# Patient Record
Sex: FEMALE | Race: BLACK OR AFRICAN AMERICAN | NOT HISPANIC OR LATINO | Employment: UNEMPLOYED | URBAN - METROPOLITAN AREA
[De-identification: names, ages, dates, MRNs, and addresses within clinical notes are randomized per-mention and may not be internally consistent; named-entity substitution may affect disease eponyms.]

---

## 2024-03-04 ENCOUNTER — HOSPITAL ENCOUNTER (EMERGENCY)
Facility: CLINIC | Age: 29
Discharge: HOME | End: 2024-03-04
Attending: EMERGENCY MEDICINE | Admitting: EMERGENCY MEDICINE
Payer: MEDICAID

## 2024-03-04 ENCOUNTER — APPOINTMENT (OUTPATIENT)
Dept: GENERAL RADIOLOGY | Facility: CLINIC | Age: 29
End: 2024-03-04
Attending: EMERGENCY MEDICINE
Payer: MEDICAID

## 2024-03-04 VITALS
BODY MASS INDEX: 37.56 KG/M2 | HEART RATE: 60 BPM | SYSTOLIC BLOOD PRESSURE: 128 MMHG | RESPIRATION RATE: 16 BRPM | TEMPERATURE: 97.8 F | OXYGEN SATURATION: 99 % | DIASTOLIC BLOOD PRESSURE: 88 MMHG | HEIGHT: 64 IN | WEIGHT: 220 LBS

## 2024-03-04 DIAGNOSIS — S82.831A CLOSED FRACTURE OF DISTAL END OF RIGHT FIBULA, UNSPECIFIED FRACTURE MORPHOLOGY, INITIAL ENCOUNTER: ICD-10-CM

## 2024-03-04 PROCEDURE — 99284 EMERGENCY DEPT VISIT MOD MDM: CPT | Mod: 25 | Performed by: EMERGENCY MEDICINE

## 2024-03-04 PROCEDURE — 99284 EMERGENCY DEPT VISIT MOD MDM: CPT | Performed by: EMERGENCY MEDICINE

## 2024-03-04 PROCEDURE — 73610 X-RAY EXAM OF ANKLE: CPT | Mod: RT

## 2024-03-04 PROCEDURE — 29515 APPLICATION SHORT LEG SPLINT: CPT | Mod: RT | Performed by: EMERGENCY MEDICINE

## 2024-03-04 PROCEDURE — 250N000013 HC RX MED GY IP 250 OP 250 PS 637: Performed by: EMERGENCY MEDICINE

## 2024-03-04 RX ORDER — HYDROCODONE BITARTRATE AND ACETAMINOPHEN 5; 325 MG/1; MG/1
1 TABLET ORAL ONCE
Status: COMPLETED | OUTPATIENT
Start: 2024-03-04 | End: 2024-03-04

## 2024-03-04 RX ORDER — IBUPROFEN 600 MG/1
600 TABLET, FILM COATED ORAL ONCE
Status: COMPLETED | OUTPATIENT
Start: 2024-03-04 | End: 2024-03-04

## 2024-03-04 RX ORDER — HYDROCODONE BITARTRATE AND ACETAMINOPHEN 5; 325 MG/1; MG/1
1 TABLET ORAL EVERY 6 HOURS PRN
Qty: 10 TABLET | Refills: 0 | Status: SHIPPED | OUTPATIENT
Start: 2024-03-04 | End: 2024-03-07

## 2024-03-04 RX ADMIN — HYDROCODONE BITARTRATE AND ACETAMINOPHEN 1 TABLET: 5; 325 TABLET ORAL at 18:19

## 2024-03-04 RX ADMIN — HYDROCODONE BITARTRATE AND ACETAMINOPHEN 1 TABLET: 5; 325 TABLET ORAL at 20:55

## 2024-03-04 RX ADMIN — IBUPROFEN 600 MG: 600 TABLET, FILM COATED ORAL at 21:20

## 2024-03-04 ASSESSMENT — COLUMBIA-SUICIDE SEVERITY RATING SCALE - C-SSRS
2. HAVE YOU ACTUALLY HAD ANY THOUGHTS OF KILLING YOURSELF IN THE PAST MONTH?: NO
6. HAVE YOU EVER DONE ANYTHING, STARTED TO DO ANYTHING, OR PREPARED TO DO ANYTHING TO END YOUR LIFE?: NO
1. IN THE PAST MONTH, HAVE YOU WISHED YOU WERE DEAD OR WISHED YOU COULD GO TO SLEEP AND NOT WAKE UP?: NO

## 2024-03-04 ASSESSMENT — ACTIVITIES OF DAILY LIVING (ADL)
ADLS_ACUITY_SCORE: 33
ADLS_ACUITY_SCORE: 33
ADLS_ACUITY_SCORE: 35
ADLS_ACUITY_SCORE: 35
ADLS_ACUITY_SCORE: 33

## 2024-03-04 NOTE — ED TRIAGE NOTES
Pt. got foot caught  in back strap while getting out of the car and twisted right ankle.  Pt. states heard a pop.  Now ankle very painful, unable to walk on it.     Triage Assessment (Adult)       Row Name 03/04/24 1039          Triage Assessment    Airway WDL WDL        Respiratory WDL    Respiratory WDL WDL        Skin Circulation/Temperature WDL    Skin Circulation/Temperature WDL WDL        Cardiac WDL    Cardiac WDL WDL        Peripheral/Neurovascular WDL    Peripheral Neurovascular WDL WDL        Cognitive/Neuro/Behavioral WDL    Cognitive/Neuro/Behavioral WDL WDL

## 2024-03-04 NOTE — ED PROVIDER NOTES
"    [facility] EMERGENCY DEPARTMENT ([facility])    3/04/24      ED PROVIDER NOTE    History     Chief Complaint   Patient presents with    Ankle Pain     HPI  Mitzi See is a 28 year old, otherwise healthy female who presents to the Emergency Department due to right ankle pain.  Patient caught her right foot and back trouble getting out of the car and twisted her right ankle and heard a pop.  The ankle became swollen and she was unable to bear weight on it, prompting her to come to the emergency department for further evaluation.  She has not injured sick before.  She has no foot or knee pain.  No hip pain.  Did not injure anything else in the fall.    Past Medical History  History reviewed. No pertinent past medical history.  History reviewed. No pertinent surgical history.  HYDROcodone-acetaminophen (NORCO) 5-325 MG tablet      No Known Allergies  Family History  History reviewed. No pertinent family history.  Social History   Social History     Tobacco Use    Smoking status: Never    Smokeless tobacco: Never   Substance Use Topics    Alcohol use: Never    Drug use: Never         A complete review of systems was performed with pertinent positives and negatives noted in the HPI, and all other systems negative.    Physical Exam   BP: (!) 138/91  Pulse: 69  Temp: 97.8  F (36.6  C)  Resp: 16  Height: 162.6 cm (5' 4\")  Weight: 99.8 kg (220 lb)  SpO2: 100 %  Physical Exam  Vitals and nursing note reviewed.   Constitutional:       General: She is in acute distress.      Appearance: Normal appearance. She is not ill-appearing, toxic-appearing or diaphoretic.   HENT:      Head: Normocephalic and atraumatic.      Mouth/Throat:      Mouth: Mucous membranes are moist.      Pharynx: Oropharynx is clear.   Eyes:      General: No scleral icterus.     Conjunctiva/sclera: Conjunctivae normal.   Cardiovascular:      Rate and Rhythm: Normal rate and regular rhythm.   Pulmonary:      Effort: Pulmonary effort is normal. " No respiratory distress.   Abdominal:      General: Abdomen is flat.   Musculoskeletal:         General: Swelling, tenderness, deformity and signs of injury present.      Cervical back: Neck supple.      Comments: Right ankle swelling and limited ROM; tenderness laterally; no foot tenderness, no proximal tibia tenderness   Skin:     General: Skin is warm.      Findings: No rash.   Neurological:      General: No focal deficit present.      Mental Status: She is alert and oriented to person, place, and time.      Sensory: No sensory deficit.      Motor: No weakness.   Psychiatric:         Mood and Affect: Mood normal.         Behavior: Behavior normal.           ED Course, Procedures, & Data      [facility]    Splint Application    Date/Time: 3/4/2024 9:05 PM    Performed by: Ac Mott MD  Authorized by: Ac Mott MD    Risks, benefits and alternatives discussed.      PRE-PROCEDURE DETAILS     Sensation:  Normal    PROCEDURE DETAILS     Laterality:  Right    Location:  Ankle    Ankle:  R ankle    Strapping: yes      Cast type:  Short leg    Splint type:  Short leg and ankle stirrup    Supplies:  Plaster    POST PROCEDURE DETAILS     Pain:  Improved    Sensation:  Normal      PROCEDURE    Patient Tolerance:  Patient tolerated the procedure well with no immediate complications         A consult was attained from the orthopedic service. The case was discussed with on call resident from that service. The consulting service's recommendations were provided promptly.      Results for orders placed or performed during the hospital encounter of 03/04/24   Ankle XR, G/E 3 views, right     Status: None    Narrative    EXAM: XR ANKLE RIGHT G/E 3 VIEWS  LOCATION: [facility]  DATE: 3/4/2024    INDICATION: ankle pain, rolled ankle  COMPARISON: None.      Impression    IMPRESSION: Acute oblique fracture of the distal  fibular metaphysis extending to level tibiotalar joint with approximately 2 mm of posterolateral displacement of the distal fragment. There is mild widening of ankle mortise and medial tibiotalar clear   space. Soft tissue swelling about the ankle.     Medications   ibuprofen (ADVIL/MOTRIN) tablet 600 mg (has no administration in time range)   HYDROcodone-acetaminophen (NORCO) 5-325 MG per tablet 1 tablet (1 tablet Oral $Given 3/4/24 1819)   HYDROcodone-acetaminophen (NORCO) 5-325 MG per tablet 1 tablet (1 tablet Oral $Given 3/4/24 2055)     Labs Ordered and Resulted from Time of ED Arrival to Time of ED Departure - No data to display  Ankle XR, G/E 3 views, right   Final Result   IMPRESSION: Acute oblique fracture of the distal fibular metaphysis extending to level tibiotalar joint with approximately 2 mm of posterolateral displacement of the distal fragment. There is mild widening of ankle mortise and medial tibiotalar clear    space. Soft tissue swelling about the ankle.             Critical care was not performed.     Medical Decision Making  The patient's presentation was of moderate complexity (an acute complicated injury).    The patient's evaluation involved:  ordering and/or review of 1 test(s) in this encounter (see separate area of note for details)  discussion of management or test interpretation with another health professional (see separate area of note for details)    The patient's management necessitated moderate risk (prescription drug management including medications given in the ED).    Assessment & Plan      Mitzi See is a 28 year old, otherwise healthy female who presents to the Emergency Department due to right ankle pain.  Patient is not acutely toxic appearing department, though she does appear distressed and has a swollen right ankle and an elevated blood pressure to 138/91 in the setting of pain.  She has limited range of motion secondary to swelling.  She has no foot tenderness  and no proximal tibia tenderness.  X-ray of the right ankle obtained and concerning for a distal fibular fracture on my read.  I did  orthopedic surgery who recommended a short leg splint with stirrup and orthopedic referral for outpatient management as this will require operative intervention due to the instability of the fracture.  I placed the plaster splint which patient tolerated well.  She was given instructions for using crutches and nonweightbearing status.  Orthopedic referral placed and patient discharged with outpatient follow-up and return precautions.    I have reviewed the nursing notes. I have reviewed the findings, diagnosis, plan and need for follow up with the patient.    New Prescriptions    HYDROCODONE-ACETAMINOPHEN (NORCO) 5-325 MG TABLET    Take 1 tablet by mouth every 6 hours as needed for severe pain       Final diagnoses:   Closed fracture of distal end of right fibula, unspecified fracture morphology, initial encounter       Ac Mott MD  [facility] EMERGENCY DEPARTMENT  3/4/2024        Ac Mott MD  03/04/24 2100

## 2024-03-04 NOTE — LETTER
March 4, 2024      To Whom It May Concern:      Mitzi See was seen in our Emergency Department today, 03/04/24.  I expect her condition to improve over the next 5 days.  May return when cleared to returned by orthopedic surgeon.     Sincerely,        Jerilyn De Jesus RN

## 2024-03-05 NOTE — DISCHARGE INSTRUCTIONS
Do not put weight on the injured foot/ankle. Follow up with orthopedic surgery in the next few days - they will call to schedule an appointment. Return if you have severe pain or lose sensation or movement of your toes or foot.

## 2024-03-07 ENCOUNTER — OFFICE VISIT (OUTPATIENT)
Dept: ORTHOPEDICS | Facility: CLINIC | Age: 29
End: 2024-03-07
Payer: MEDICAID

## 2024-03-07 ENCOUNTER — TELEPHONE (OUTPATIENT)
Dept: ORTHOPEDICS | Facility: CLINIC | Age: 29
End: 2024-03-07

## 2024-03-07 ENCOUNTER — HOSPITAL ENCOUNTER (OUTPATIENT)
Facility: AMBULATORY SURGERY CENTER | Age: 29
End: 2024-03-07
Attending: ORTHOPAEDIC SURGERY | Admitting: ORTHOPAEDIC SURGERY
Payer: MEDICAID

## 2024-03-07 ENCOUNTER — HOSPITAL ENCOUNTER (OUTPATIENT)
Facility: CLINIC | Age: 29
End: 2024-03-07
Attending: ORTHOPAEDIC SURGERY | Admitting: ORTHOPAEDIC SURGERY

## 2024-03-07 ENCOUNTER — OFFICE VISIT (OUTPATIENT)
Dept: PODIATRY | Facility: CLINIC | Age: 29
End: 2024-03-07
Attending: EMERGENCY MEDICINE
Payer: MEDICAID

## 2024-03-07 VITALS
OXYGEN SATURATION: 100 % | BODY MASS INDEX: 37.56 KG/M2 | HEIGHT: 64 IN | DIASTOLIC BLOOD PRESSURE: 75 MMHG | SYSTOLIC BLOOD PRESSURE: 111 MMHG | HEART RATE: 91 BPM | WEIGHT: 220 LBS

## 2024-03-07 DIAGNOSIS — S82.891A CLOSED FRACTURE OF RIGHT ANKLE, INITIAL ENCOUNTER: Primary | ICD-10-CM

## 2024-03-07 DIAGNOSIS — S82.831A CLOSED FRACTURE OF DISTAL END OF RIGHT FIBULA, UNSPECIFIED FRACTURE MORPHOLOGY, INITIAL ENCOUNTER: ICD-10-CM

## 2024-03-07 PROCEDURE — 99204 OFFICE O/P NEW MOD 45 MIN: CPT | Performed by: ORTHOPAEDIC SURGERY

## 2024-03-07 PROCEDURE — 99203 OFFICE O/P NEW LOW 30 MIN: CPT | Performed by: PODIATRIST

## 2024-03-07 RX ORDER — CELECOXIB 100 MG/1
400 CAPSULE ORAL ONCE
Status: CANCELLED | OUTPATIENT
Start: 2024-03-07 | End: 2024-03-07

## 2024-03-07 RX ORDER — ACETAMINOPHEN 325 MG/1
975 TABLET ORAL ONCE
Status: CANCELLED | OUTPATIENT
Start: 2024-03-07 | End: 2024-03-07

## 2024-03-07 ASSESSMENT — PAIN SCALES - GENERAL: PAINLEVEL: SEVERE PAIN (6)

## 2024-03-07 NOTE — TELEPHONE ENCOUNTER
Type of surgery: OPEN REDUCTION INTERNAL FIXATION, FRACTURE, ANKLE (Right)   Location of surgery: [facility]  Date and time of surgery: 3-7-24  Surgeon: Regulo  Pre-Op Appt Date: Was done in ER visit 3-5-24  Post-Op Appt Date: TBD   Packet sent out: No  Pre-cert/Authorization completed:    Date:

## 2024-03-07 NOTE — LETTER
3/7/2024         RE: Mitzi See  [address] [address] [facility] [address] [address]        Dear Colleague,    Thank you for referring your patient, Mitzi See, to the [facility]. Please see a copy of my visit note below.    Subjective:    Patient seen as a new patient consult from Dr. Mott and is seen today  for right distal fibular fracture.  On 3/4/2024 patient getting out of her car.  She caught her right foot and twisted her ankle heard a pop.  Had swelling and pain.  Unable to bear weight on this.  She went to the emergency department that day.  She had x-rays.  Was diagnosed with ankle fracture.  Was placed in fiberglass splint she has been nonweightbearing.  She has never injured this before.  Patient otherwise healthy.      ROS:  see above       No Known Allergies    Current Outpatient Medications   Medication Sig Dispense Refill     HYDROcodone-acetaminophen (NORCO) 5-325 MG tablet Take 1 tablet by mouth every 6 hours as needed for severe pain 10 tablet 0       There is no problem list on file for this patient.      No past medical history on file.    No past surgical history on file.    No family history on file.    Social History     Tobacco Use     Smoking status: Never     Smokeless tobacco: Never   Substance Use Topics     Alcohol use: Never         Exam:    Vitals: LMP 03/04/2024   BMI: There is no height or weight on file to calculate BMI.  Height: Data Unavailable    Constitutional/ general:  Pt is in no apparent distress, appears well-nourished.  Cooperative with history and physical exam.     Psych:  The patient answered questions appropriately.  Normal affect.  Seems to have reasonable expectations, in terms of treatment.     Lungs:  Non labored breathing, non labored speech. No cough.  No audible wheezing. Even, quiet breathing.       Vascular: Toes warm to touch.  Capillary refill less than 3 seconds in the digits.    Neuro:  Alert and oriented x 3. Coordinated  gait.  Light touch sensation is intact      Derm: Normal texture and turgor.  No erythema, ecchymosis, or cyanosis.      Musculoskeletal:    No gross deformities.   Fiberglass splint on right leg.    Radiographic Exam:  X-Ray Findings:  I personally reviewed the films.  Right distal fibular oblique fracture.  Widening of the ankle mortise medial.  Possible small fracture posterior malleoli.    Assessment: Right bimalleolar ankle fracture with instability    Plan:  X-rays personally reviewed.  Discussed with patient unfortunately has wide medial gutter and possible bimalleolar fracture.  Discussed this joint is in stable.  May need fixation to help limit arthritis in the future.  Will refer to orthopedic surgeon in the [facility] this afternoon for discussion of this.      Regis Solomon DPM, FACFAS        Again, thank you for allowing me to participate in the care of your patient.        Sincerely,        Regis Solomon DPM

## 2024-03-07 NOTE — LETTER
3/7/2024         RE: Mitzi See  [address] [address] [facility] [address]        Dear Colleague,    Thank you for referring your patient, Mitzi See, to the [facility]. Please see a copy of my visit note below.    SUBJECTIVE:  Mitzi See is a 28 year old female who is seen as a referral from Dr. Solomon for right ankle injury that occurred 3/4, following acute injury.    Mechanism of injury: inversion   Present symptoms: Patient caught her right foot  getting out of the car and twisted her right ankle and heard a pop. The ankle became swollen and she was unable to bear weight on it, prompting her to come to the emergency department     Previous treatment::  ER: splint, elevation, non weight bearing     Prior history of related problems: no prior problems with this area in the past.    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    No past medical history on file.     No past surgical history on file.    REVIEW OF SYSTEMS:  CONSTITUTIONAL:  NEGATIVE for fever, chills, change in weight  INTEGUMENTARY/SKIN:  NEGATIVE for worrisome rashes, moles or lesions  EYES:  NEGATIVE for vision changes or irritation  ENT/MOUTH:  NEGATIVE for ear, mouth and throat problems  RESP:  NEGATIVE for significant cough or SOB  BREAST:  NEGATIVE for masses, tenderness or discharge  CV:  NEGATIVE for chest pain, palpitations or peripheral edema  GI:  NEGATIVE for nausea, abdominal pain, heartburn, or change in bowel habits  :  Negative   MUSCULOSKELETAL:  See HPI above  NEURO:  NEGATIVE for weakness, dizziness or paresthesias  ENDOCRINE:  NEGATIVE for temperature intolerance, skin/hair changes  HEME/ALLERGY/IMMUNE:  NEGATIVE for bleeding problems  PSYCHIATRIC:  NEGATIVE for changes in mood or affect    EXAM:  [facility] 03/04/2024   GENERAL APPEARANCE: healthy, alert, and no distress   GAIT: not tested   NEURO: Normal strength and tone, sensory exam grossly normal, mentation  intact, and speech normal  Sensation: intact  PSYCH:  mentation appears normal and affect normal/bright    MUSCULOSKELETAL:    ANKLE  Inspection: Swelling: moderate. ,   Skin: there are small clear blisters on both sides of the ankle.  The skin does not wrinkle.  Tender:lateral malleolus, medial malleolus, deltoid ligament  Range of Motion:  not tested --all movements painful      X-RAY INTERPRETATION  Xrays from 3/4/24  Acute oblique fracture of the distal fibular metaphysis extending to level tibiotalar joint with approximately 2 mm of posterolateral displacement of the distal fragment. There is mild widening of ankle mortise and medial tibiotalar clear   space    ASSESSMENT/PLAN  Right distal fibular fracture, displaced with mortice disruption.  Possible Deltoid ligament tear.     Surgery reccommended.   ORIF planned  with with possible syndesmosis repair, but unlikely to be needed.  Possible Deltoid ligament repair.  The procedure was discussed in detail with the patient, including alternatives to the proposed procedure, and expected recovery.  Risks were discussed, including, but not limited to infection, neurovascular injury, DVT/PE, failure to relieve pain, fracture nonunion and anesthetic complications.  Postoperative course discussed as well.    Mitzi See is a 28 year old female who sustained an ankle fracture on 3/4 when she twisted the ankle getting out of a car. The fracture is characterized on xray by a displaced Vyas type right distal fibula fracture, with widening of the mortice and possible deltoid ligament tear.   It is felt that open reduction and internal fixation is the best treatment for this injury.    Work note: written. Can work from home.    MANAN Rajput MD  Dept. Orthopedic Surgery  [facility]         Again, thank you for allowing me to participate in the care of your patient.        Sincerely,        Rudolph Rajput MD

## 2024-03-07 NOTE — PROGRESS NOTES
Subjective:    Patient seen as a new patient consult from Dr. Mott and is seen today  for right distal fibular fracture.  On 3/4/2024 patient getting out of her car.  She caught her right foot and twisted her ankle heard a pop.  Had swelling and pain.  Unable to bear weight on this.  She went to the emergency department that day.  She had x-rays.  Was diagnosed with ankle fracture.  Was placed in fiberglass splint she has been nonweightbearing.  She has never injured this before.  Patient otherwise healthy.      ROS:  see above       No Known Allergies    Current Outpatient Medications   Medication Sig Dispense Refill    HYDROcodone-acetaminophen (NORCO) 5-325 MG tablet Take 1 tablet by mouth every 6 hours as needed for severe pain 10 tablet 0       There is no problem list on file for this patient.      No past medical history on file.    No past surgical history on file.    No family history on file.    Social History     Tobacco Use    Smoking status: Never    Smokeless tobacco: Never   Substance Use Topics    Alcohol use: Never         Exam:    Vitals: LMP 03/04/2024   BMI: There is no height or weight on file to calculate BMI.  Height: Data Unavailable    Constitutional/ general:  Pt is in no apparent distress, appears well-nourished.  Cooperative with history and physical exam.     Psych:  The patient answered questions appropriately.  Normal affect.  Seems to have reasonable expectations, in terms of treatment.     Lungs:  Non labored breathing, non labored speech. No cough.  No audible wheezing. Even, quiet breathing.       Vascular: Toes warm to touch.  Capillary refill less than 3 seconds in the digits.    Neuro:  Alert and oriented x 3. Coordinated gait.  Light touch sensation is intact      Derm: Normal texture and turgor.  No erythema, ecchymosis, or cyanosis.      Musculoskeletal:    No gross deformities.   Fiberglass splint on right leg.    Radiographic Exam:  X-Ray Findings:  I personally  reviewed the films.  Right distal fibular oblique fracture.  Widening of the ankle mortise medial.  Possible small fracture posterior malleoli.    Assessment: Right bimalleolar ankle fracture with instability    Plan:  X-rays personally reviewed.  Discussed with patient unfortunately has wide medial gutter and possible bimalleolar fracture.  Discussed this joint is in stable.  May need fixation to help limit arthritis in the future.  Will refer to orthopedic surgeon in the [facility] this afternoon for discussion of this.      Regis Solomon DPM, FACFAS

## 2024-03-08 ENCOUNTER — TELEPHONE (OUTPATIENT)
Dept: URGENT CARE | Facility: URGENT CARE | Age: 29
End: 2024-03-08

## 2024-03-08 DIAGNOSIS — S82.891A CLOSED FRACTURE OF RIGHT ANKLE, INITIAL ENCOUNTER: Primary | ICD-10-CM

## 2024-03-08 NOTE — TELEPHONE ENCOUNTER
Type of surgery:     OPEN REDUCTION INTERNAL FIXATION, FRACTURE, ANKLE      Location of surgery: [facility]  Date and time of surgery: 03/15/2024  Surgeon: SCOTT  Pre-Op Appt Date: 03/14/2024  Post-Op Appt Date: 03/28/2024  Packet sent out: Yes / MAIL   Pre-cert/Authorization completed:  No  Date:

## 2024-03-10 ENCOUNTER — HEALTH MAINTENANCE LETTER (OUTPATIENT)
Age: 29
End: 2024-03-10

## 2024-03-11 ENCOUNTER — MYC MEDICAL ADVICE (OUTPATIENT)
Dept: ORTHOPEDICS | Facility: CLINIC | Age: 29
End: 2024-03-11
Payer: MEDICAID

## 2024-03-11 DIAGNOSIS — S82.891A CLOSED FRACTURE OF RIGHT ANKLE, INITIAL ENCOUNTER: Primary | ICD-10-CM

## 2024-03-12 RX ORDER — CEFAZOLIN SODIUM/WATER 2 G/20 ML
2 SYRINGE (ML) INTRAVENOUS
Status: CANCELLED | OUTPATIENT
Start: 2024-03-15

## 2024-03-12 RX ORDER — CEFAZOLIN SODIUM/WATER 2 G/20 ML
2 SYRINGE (ML) INTRAVENOUS SEE ADMIN INSTRUCTIONS
Status: CANCELLED | OUTPATIENT
Start: 2024-03-15

## 2024-03-12 RX ORDER — ACETAMINOPHEN 325 MG/1
975 TABLET ORAL ONCE
Status: CANCELLED | OUTPATIENT
Start: 2024-03-15

## 2024-03-12 RX ORDER — HYDROCODONE BITARTRATE AND ACETAMINOPHEN 5; 325 MG/1; MG/1
1 TABLET ORAL EVERY 6 HOURS PRN
Qty: 15 TABLET | Refills: 0 | Status: SHIPPED | OUTPATIENT
Start: 2024-03-12 | End: 2024-03-25

## 2024-03-12 RX ORDER — CELECOXIB 200 MG/1
400 CAPSULE ORAL ONCE
Status: CANCELLED | OUTPATIENT
Start: 2024-03-15

## 2024-03-12 NOTE — TELEPHONE ENCOUNTER
Patient received rx from Dr. Ford Mott from ED visit. Surgery scheduled for 3/15.    Suyapa CARTAGENA, Specialty RN 3/12/2024 7:23 AM

## 2024-03-13 ENCOUNTER — ANESTHESIA EVENT (OUTPATIENT)
Dept: SURGERY | Facility: AMBULATORY SURGERY CENTER | Age: 29
End: 2024-03-13
Payer: MEDICAID

## 2024-03-13 NOTE — PROGRESS NOTES
SUBJECTIVE:   Mitzi See is here in follow up of his right distal fibular fracture, displaced with mortice disruption  with possible Deltoid ligament tear He is here today for a skin check prior to pending 3/15/24 surgery.    Present symptoms: pain is less   Treatments tried to this point: splinting with crutch use, non weight bearing.    Has been elevating     Review of Systems:  Constitutional/General: Negative for fever, chills, change in weight  Integumentary/Skin: Negative for worrisome rashes, moles, or lesions  Neuro: Negative for weakness, dizziness, or paresthesias   Psychiatric: negative for changes in mood or affect    OBJECTIVE:  Physical Exam:  /74 (BP Location: Right arm, Patient Position: Sitting, Cuff Size: Adult Regular)   Pulse 81   LMP 03/04/2024   SpO2 100%   General Appearance: healthy, alert and no distress   Skin: no suspicious lesions or rashes  Neuro: Normal strength and tone, mentation intact and speech normal  Vascular: good pulses, and capillary refill   Lymph: no lymphadenopathy   Psych:  mentation appears normal and affect normal/bright  Resp: no increased work of breathing    Right Ankle Exam:  Skin is looking better     ASSESSMENT:   Lateral malleolus fracture with possible deltoid ligament tear    PLAN:   Because of some financial issues, she needs more time to figure out her options for coverage.  We will postpone the procedure to next week, but can't be delayed any longer than that.  Padding applied to the ankle and a fracture boot placed  All questions answered    MANAN Rajput MD  Dept. Orthopedic Surgery  [facility]

## 2024-03-14 ENCOUNTER — OFFICE VISIT (OUTPATIENT)
Dept: ORTHOPEDICS | Facility: CLINIC | Age: 29
End: 2024-03-14
Payer: MEDICAID

## 2024-03-14 VITALS — HEART RATE: 81 BPM | SYSTOLIC BLOOD PRESSURE: 120 MMHG | DIASTOLIC BLOOD PRESSURE: 74 MMHG | OXYGEN SATURATION: 100 %

## 2024-03-14 DIAGNOSIS — S82.891D CLOSED FRACTURE OF RIGHT ANKLE WITH ROUTINE HEALING, SUBSEQUENT ENCOUNTER: Primary | ICD-10-CM

## 2024-03-14 PROCEDURE — 99213 OFFICE O/P EST LOW 20 MIN: CPT | Performed by: ORTHOPAEDIC SURGERY

## 2024-03-14 ASSESSMENT — PAIN SCALES - GENERAL: PAINLEVEL: MODERATE PAIN (4)

## 2024-03-14 NOTE — TELEPHONE ENCOUNTER
Patient has been canceled per Fransisco Arndt   Surgery/Procedure Scheduling Pool; Rosi Cueva M40 minutes ago (1:06 PM)     NG  Dr Rajput just got in touch with financial securing rep regarding getting patient insurance coverage. . They decided to cancel this patient until NEXT WEEK(please call pt to reschedule for [facility] or [facility]).

## 2024-03-14 NOTE — LETTER
3/14/2024         RE: Mitzi See  [address] [address] [facility] [address] [facility] [address]        Dear Colleague,    Thank you for referring your patient, Mitzi See, to the [facility]. Please see a copy of my visit note below.    SUBJECTIVE:   Mitzi See is here in follow up of his right distal fibular fracture, displaced with mortice disruption  with possible Deltoid ligament tear He is here today for a skin check prior to pending 3/15/24 surgery.    Present symptoms: pain is less   Treatments tried to this point: splinting with crutch use, non weight bearing.    Has been elevating     Review of Systems:  Constitutional/General: Negative for fever, chills, change in weight  Integumentary/Skin: Negative for worrisome rashes, moles, or lesions  Neuro: Negative for weakness, dizziness, or paresthesias   Psychiatric: negative for changes in mood or affect    OBJECTIVE:  Physical Exam:  /74 (BP Location: Right arm, Patient Position: Sitting, Cuff Size: Adult Regular)   Pulse 81   LMP 03/04/2024   SpO2 100%   General Appearance: healthy, alert and no distress   Skin: no suspicious lesions or rashes  Neuro: Normal strength and tone, mentation intact and speech normal  Vascular: good pulses, and capillary refill   Lymph: no lymphadenopathy   Psych:  mentation appears normal and affect normal/bright  Resp: no increased work of breathing    Right Ankle Exam:  Skin is looking better     ASSESSMENT:   Lateral malleolus fracture with possible deltoid ligament tear    PLAN:   Because of some financial issues, she needs more time to figure out her options for coverage.  We will postpone the procedure to next week, but can't be delayed any longer than that.  Padding applied to the ankle and a fracture boot placed  All questions answered    MANAN Rajput MD  Dept. Orthopedic Surgery  [facility]         Again, thank you for allowing me to participate in the care of your  patient.        Sincerely,        Rudolph Rajput MD

## 2024-03-15 ENCOUNTER — ANESTHESIA (OUTPATIENT)
Dept: SURGERY | Facility: AMBULATORY SURGERY CENTER | Age: 29
End: 2024-03-15
Payer: MEDICAID

## 2024-03-20 ENCOUNTER — TELEPHONE (OUTPATIENT)
Dept: ORTHOPEDICS | Facility: CLINIC | Age: 29
End: 2024-03-20
Payer: MEDICAID

## 2024-03-20 NOTE — TELEPHONE ENCOUNTER
[facility] Call Center    Phone Message    May a detailed message be left on voicemail: yes     Reason for Call: Other: pt calling requesting for Dr. Montejo's Nurse to call back regarding surgery. Pt would like to be put back on schedule for 03/22. Pt spoke with ins and got that concern figured out. Can care team reach out to pt?     Action Taken: Other: te    Travel Screening: Not Applicable

## 2024-03-21 NOTE — TELEPHONE ENCOUNTER
Called patient and left detailed message that surgery has been rescheduled for next available of Monday 3-25.

## 2024-03-22 NOTE — ANESTHESIA PREPROCEDURE EVALUATION
"Anesthesia Pre-Procedure Evaluation    Patient: Mitzi See   MRN: [id number] : 1995        Procedure : Procedure(s):  OPEN REDUCTION INTERNAL FIXATION, FRACTURE, ANKLE  possible deltoid ligament repair          Past Medical History:   Diagnosis Date    Closed fracture of distal lateral malleolus of ankle       History reviewed. No pertinent surgical history.   No Known Allergies   Social History     Tobacco Use    Smoking status: Never    Smokeless tobacco: Never   Substance Use Topics    Alcohol use: Never      Wt Readings from Last 1 Encounters:   24 99.8 kg (220 lb)        Anesthesia Evaluation            ROS/MED HX  ENT/Pulmonary:  - neg pulmonary ROS     Neurologic:  - neg neurologic ROS     Cardiovascular:  - neg cardiovascular ROS  (-) murmur   METS/Exercise Tolerance: >4 METS    Hematologic:  - neg hematologic  ROS     Musculoskeletal:   (+)     fracture, Fracture location: RLE,         GI/Hepatic:  - neg GI/hepatic ROS     Renal/Genitourinary:  - neg Renal ROS     Endo:  - neg endo ROS     Psychiatric/Substance Use:  - neg psychiatric ROS     Infectious Disease:  - neg infectious disease ROS     Malignancy:  - neg malignancy ROS     Other:  - neg other ROS          Physical Exam    Airway        Mallampati: III   TM distance: > 3 FB   Neck ROM: full   Mouth opening: > 3 cm    Respiratory Devices and Support         Dental     Comment: Braces, no chipped or loose teeth.         Cardiovascular          Rhythm and rate: regular and normal (-) no murmur    Pulmonary   pulmonary exam normal        breath sounds clear to auscultation           OUTSIDE LABS:  CBC: No results found for: \"WBC\", \"HGB\", \"HCT\", \"PLT\"  BMP: No results found for: \"NA\", \"POTASSIUM\", \"CHLORIDE\", \"CO2\", \"BUN\", \"CR\", \"GLC\"  COAGS: No results found for: \"PTT\", \"INR\", \"FIBR\"  POC: No results found for: \"BGM\", \"HCG\", \"HCGS\"  HEPATIC: No results found for: \"ALBUMIN\", \"PROTTOTAL\", \"ALT\", \"AST\", \"GGT\", \"ALKPHOS\", " "\"BILITOTAL\", \"BILIDIRECT\", \"KAMAR\"  OTHER: No results found for: \"PH\", \"LACT\", \"A1C\", \"FAMILIA\", \"PHOS\", \"MAG\", \"LIPASE\", \"AMYLASE\", \"TSH\", \"T4\", \"T3\", \"CRP\", \"SED\"    Anesthesia Plan    ASA Status:  2    NPO Status:  NPO Appropriate    Anesthesia Type: General.     - Airway: LMA   Induction: Intravenous, Propofol.   Maintenance: TIVA.        Consents    Anesthesia Plan(s) and associated risks, benefits, and realistic alternatives discussed. Questions answered and patient/representative(s) expressed understanding.     - Discussed:     - Discussed with:  Patient      - Extended Intubation/Ventilatory Support Discussed: No.      - Patient is DNR/DNI Status: No     Use of blood products discussed: No .     Postoperative Care    Pain management: IV analgesics, Peripheral nerve block (Single Shot).   PONV prophylaxis: Ondansetron (or other 5HT-3), Background Propofol Infusion     Comments:    Other Comments: Discussed plan for general anesthetic with LMA. Discussed risks of sore throat, post op pain/nausea, oropharyngeal damage, rare major complications.  Discussed plan for adductor canal nerve block and Popliteal Sciatic nerve block with risks of block failure, bleeding, infection, damage to surrounding structures (muscles, nerve, blood vessels, other structures), persistent numbness/weakness, medication reactions.             Rey Meza MD    I have reviewed the pertinent notes and labs in the chart from the past 30 days and (re)examined the patient.  Any updates or changes from those notes are reflected in this note.              # Obesity: Estimated body mass index is 37.76 kg/m  as calculated from the following:    Height as of 3/7/24: 1.626 m (5' 4\").    Weight as of 3/7/24: 99.8 kg (220 lb).      "

## 2024-03-24 NOTE — OP NOTE
Procedure Date: 3/25/2024     OPERATIVE NOTE:    PRE-OP DIAGNOSIS:  right distal fibula fracture and Deltoid ligament tear.     POST-OP DIAGNOSIS:  right distal fibula fracture and Deltoid ligament tear.    PROCEDURE:  open reduction and internal fixation right distal fibular fracture and Deltoid ligament repair    SURGEON: Regulo    ASSISTANT(S):  ELO Bloom    ANESTHESIA:  general + popliteal block    Indications:  Mitzi See is a 28 year old female with a right distal fibular fracture.  The fracture is characterized by a mildly displaced Vyas B fibular fracture with widening of the medial clear space out of proportion to the amount of fibular fracture displacement. The syndesmosis did not appear to be disrupted.   It was felt that surgical intervention was most appropriate. Informed consent was obtained for the procedure.    Procedure:  Patient was taken to the operating room after the popliteal block was placed.  She was placed on the operating table in the supine position where anesthesia was induced. A tourniquet was placed around the proximal thigh and a bump placed under the hip .  The limb was prepped and draped in usual sterile fashion. Pause for site confirmation was performed.    A longitudinal incision was made over the subcutaneous border of the distal fibula. The incision was carefully taken down through the skin and the subcutaneous tissue to the sub-cutaneous distal fibula. The fracture was exposed with minimal stripping. The fracture was then reduced  and held with clamps. A 3.5 interfragmentary lag screw was placed with fair bite and fixation. I placed a 2nd screw proximal to the first, with excellent bite  Then, an 8 hole Arthrex plate was contoured and secured to the distal fibula with 3.5 cortical screws in the proximal 3 holes, and 4.0 cancellous screws in the distal 2 holes.  The intervening holes were either over the fracture line or had interfrag screws in the way.  There  was still lateral tilt of the talus and a wider medial clear space.  I pulled on the distal fibula first with a clamp, and next with a curved pick with and without lateral traction, and there didn't appear to be any widening of the syndesmosis.    A slightly curved incision was made over the medial malleolus. Spreading technique was used to help identify the saphenous neurovascular structure which was retracted. An inferior branch of the saphenous vein was coagulated.  There was a rent in the the Deltoid mass, and through the rent I removed some deltoid fibers from the medial gutter. I did an end-end and side-side closure of the deltoid tear, which was incomplete. Fluoroscopy revealed that the talus was no longer tilting and the mortice looked more even.      The wound(s) were irrigated.  The tourniquet was deflated and minor bleeders were encountered and coagulated.   The wound(s)  were closed with a few interrupted 3-0 Vicryl sutures in the subcutaneous layer and interrupted 3-0 nylon sutures placed in a vertical mattress fashion and the skin using Allgower knots.  Sterile dressings were applied, and a well padded Tony Vargas type splint was applied.  The patient was awakened and transferred to the hospital cart and to the recovery area in stable condition.    PLAN: nonweightbearing x 4 weeks.  Start range of motion in 2 weeks.      MANAN Rajput MD  Dept. Orthopedic Surgery  [facility]

## 2024-03-25 ENCOUNTER — HOSPITAL ENCOUNTER (OUTPATIENT)
Facility: AMBULATORY SURGERY CENTER | Age: 29
Discharge: HOME | End: 2024-03-25
Attending: ORTHOPAEDIC SURGERY | Admitting: ORTHOPAEDIC SURGERY
Payer: MEDICAID

## 2024-03-25 ENCOUNTER — ANCILLARY PROCEDURE (OUTPATIENT)
Dept: GENERAL RADIOLOGY | Facility: CLINIC | Age: 29
End: 2024-03-25
Attending: ORTHOPAEDIC SURGERY
Payer: MEDICAID

## 2024-03-25 VITALS
SYSTOLIC BLOOD PRESSURE: 121 MMHG | RESPIRATION RATE: 19 BRPM | DIASTOLIC BLOOD PRESSURE: 80 MMHG | HEART RATE: 63 BPM | TEMPERATURE: 97.6 F | OXYGEN SATURATION: 100 %

## 2024-03-25 DIAGNOSIS — S82.891A CLOSED FRACTURE OF RIGHT ANKLE, INITIAL ENCOUNTER: Primary | ICD-10-CM

## 2024-03-25 DIAGNOSIS — R52 PAIN: ICD-10-CM

## 2024-03-25 LAB — HCG UR QL: NEGATIVE

## 2024-03-25 PROCEDURE — 64445 NJX AA&/STRD SCIATIC NRV IMG: CPT | Mod: 59 | Performed by: ANESTHESIOLOGY

## 2024-03-25 PROCEDURE — G8907 PT DOC NO EVENTS ON DISCHARG: HCPCS

## 2024-03-25 PROCEDURE — 81025 URINE PREGNANCY TEST: CPT | Performed by: ANESTHESIOLOGY

## 2024-03-25 PROCEDURE — 27766 OPTX MEDIAL ANKLE FX: CPT | Performed by: ANESTHESIOLOGY

## 2024-03-25 PROCEDURE — G8916 PT W IV AB GIVEN ON TIME: HCPCS

## 2024-03-25 PROCEDURE — 27766 OPTX MEDIAL ANKLE FX: CPT | Performed by: NURSE ANESTHETIST, CERTIFIED REGISTERED

## 2024-03-25 PROCEDURE — 27695 REPAIR OF ANKLE LIGAMENT: CPT | Mod: 51 | Performed by: ORTHOPAEDIC SURGERY

## 2024-03-25 PROCEDURE — C1713 ANCHOR/SCREW BN/BN,TIS/BN: HCPCS

## 2024-03-25 PROCEDURE — 27792 TREATMENT OF ANKLE FRACTURE: CPT | Mod: RT | Performed by: ORTHOPAEDIC SURGERY

## 2024-03-25 PROCEDURE — 64447 NJX AA&/STRD FEMORAL NRV IMG: CPT | Mod: 59 | Performed by: ANESTHESIOLOGY

## 2024-03-25 PROCEDURE — 27792 TREATMENT OF ANKLE FRACTURE: CPT | Mod: RT

## 2024-03-25 DEVICE — LO-PRO SCRW,TI,3.5MMX 22MM
Type: IMPLANTABLE DEVICE | Site: ANKLE | Status: FUNCTIONAL
Brand: ARTHREX®

## 2024-03-25 DEVICE — LOCKING THIRD TUBULAR PLATE, TI, 7H
Type: IMPLANTABLE DEVICE | Site: ANKLE | Status: FUNCTIONAL
Brand: ARTHREX®

## 2024-03-25 DEVICE — LO-PRO SCRW,TI,3.5MMX 16MM
Type: IMPLANTABLE DEVICE | Site: ANKLE | Status: FUNCTIONAL
Brand: ARTHREX®

## 2024-03-25 RX ORDER — ACETAMINOPHEN 325 MG/1
975 TABLET ORAL ONCE
Status: COMPLETED | OUTPATIENT
Start: 2024-03-25 | End: 2024-03-25

## 2024-03-25 RX ORDER — CELECOXIB 200 MG/1
400 CAPSULE ORAL ONCE
Status: DISCONTINUED | OUTPATIENT
Start: 2024-03-25 | End: 2024-03-26 | Stop reason: HOSPADM

## 2024-03-25 RX ORDER — BUPIVACAINE HYDROCHLORIDE 5 MG/ML
INJECTION, SOLUTION EPIDURAL; INTRACAUDAL
Status: COMPLETED | OUTPATIENT
Start: 2024-03-25 | End: 2024-03-25

## 2024-03-25 RX ORDER — ONDANSETRON 2 MG/ML
4 INJECTION INTRAMUSCULAR; INTRAVENOUS EVERY 30 MIN PRN
Status: DISCONTINUED | OUTPATIENT
Start: 2024-03-25 | End: 2024-03-26 | Stop reason: HOSPADM

## 2024-03-25 RX ORDER — ACETAMINOPHEN 325 MG/1
975 TABLET ORAL ONCE
Status: DISCONTINUED | OUTPATIENT
Start: 2024-03-25 | End: 2024-03-26 | Stop reason: HOSPADM

## 2024-03-25 RX ORDER — ONDANSETRON 4 MG/1
4 TABLET, ORALLY DISINTEGRATING ORAL
Status: CANCELLED | OUTPATIENT
Start: 2024-03-25

## 2024-03-25 RX ORDER — CEFAZOLIN SODIUM 2 G/50ML
2 SOLUTION INTRAVENOUS
Status: DISCONTINUED | OUTPATIENT
Start: 2024-03-25 | End: 2024-03-26 | Stop reason: HOSPADM

## 2024-03-25 RX ORDER — FENTANYL CITRATE 50 UG/ML
25-100 INJECTION, SOLUTION INTRAMUSCULAR; INTRAVENOUS
Status: DISCONTINUED | OUTPATIENT
Start: 2024-03-25 | End: 2024-03-26 | Stop reason: HOSPADM

## 2024-03-25 RX ORDER — ONDANSETRON 2 MG/ML
INJECTION INTRAMUSCULAR; INTRAVENOUS PRN
Status: DISCONTINUED | OUTPATIENT
Start: 2024-03-25 | End: 2024-03-25

## 2024-03-25 RX ORDER — ONDANSETRON 4 MG/1
4 TABLET, ORALLY DISINTEGRATING ORAL EVERY 30 MIN PRN
Status: DISCONTINUED | OUTPATIENT
Start: 2024-03-25 | End: 2024-03-26 | Stop reason: HOSPADM

## 2024-03-25 RX ORDER — OXYCODONE HYDROCHLORIDE 5 MG/1
10 TABLET ORAL EVERY 4 HOURS PRN
Status: DISCONTINUED | OUTPATIENT
Start: 2024-03-25 | End: 2024-03-26 | Stop reason: HOSPADM

## 2024-03-25 RX ORDER — LIDOCAINE 40 MG/G
CREAM TOPICAL
Status: DISCONTINUED | OUTPATIENT
Start: 2024-03-25 | End: 2024-03-26 | Stop reason: HOSPADM

## 2024-03-25 RX ORDER — ACETAMINOPHEN 325 MG/1
650 TABLET ORAL
Status: CANCELLED | OUTPATIENT
Start: 2024-03-25

## 2024-03-25 RX ORDER — NALOXONE HYDROCHLORIDE 0.4 MG/ML
0.1 INJECTION, SOLUTION INTRAMUSCULAR; INTRAVENOUS; SUBCUTANEOUS
Status: DISCONTINUED | OUTPATIENT
Start: 2024-03-25 | End: 2024-03-26 | Stop reason: HOSPADM

## 2024-03-25 RX ORDER — FENTANYL CITRATE 50 UG/ML
50 INJECTION, SOLUTION INTRAMUSCULAR; INTRAVENOUS EVERY 5 MIN PRN
Status: DISCONTINUED | OUTPATIENT
Start: 2024-03-25 | End: 2024-03-26 | Stop reason: HOSPADM

## 2024-03-25 RX ORDER — CEFAZOLIN SODIUM 2 G/50ML
2 SOLUTION INTRAVENOUS SEE ADMIN INSTRUCTIONS
Status: DISCONTINUED | OUTPATIENT
Start: 2024-03-25 | End: 2024-03-26 | Stop reason: HOSPADM

## 2024-03-25 RX ORDER — ONDANSETRON 4 MG/1
4 TABLET, ORALLY DISINTEGRATING ORAL EVERY 8 HOURS PRN
Qty: 4 TABLET | Refills: 0 | Status: SHIPPED | OUTPATIENT
Start: 2024-03-25

## 2024-03-25 RX ORDER — HYDRALAZINE HYDROCHLORIDE 20 MG/ML
2.5-5 INJECTION INTRAMUSCULAR; INTRAVENOUS EVERY 10 MIN PRN
Status: DISCONTINUED | OUTPATIENT
Start: 2024-03-25 | End: 2024-03-26 | Stop reason: HOSPADM

## 2024-03-25 RX ORDER — PROPOFOL 10 MG/ML
INJECTION, EMULSION INTRAVENOUS PRN
Status: DISCONTINUED | OUTPATIENT
Start: 2024-03-25 | End: 2024-03-25

## 2024-03-25 RX ORDER — LIDOCAINE HYDROCHLORIDE 20 MG/ML
INJECTION, SOLUTION INFILTRATION; PERINEURAL PRN
Status: DISCONTINUED | OUTPATIENT
Start: 2024-03-25 | End: 2024-03-25

## 2024-03-25 RX ORDER — OXYCODONE HYDROCHLORIDE 5 MG/1
5 TABLET ORAL
Status: CANCELLED | OUTPATIENT
Start: 2024-03-25

## 2024-03-25 RX ORDER — METOPROLOL TARTRATE 1 MG/ML
1-2 INJECTION, SOLUTION INTRAVENOUS EVERY 5 MIN PRN
Status: DISCONTINUED | OUTPATIENT
Start: 2024-03-25 | End: 2024-03-26 | Stop reason: HOSPADM

## 2024-03-25 RX ORDER — AMOXICILLIN 250 MG
1-2 CAPSULE ORAL 2 TIMES DAILY
Qty: 30 TABLET | Refills: 0 | Status: SHIPPED | OUTPATIENT
Start: 2024-03-25

## 2024-03-25 RX ORDER — FENTANYL CITRATE 50 UG/ML
25 INJECTION, SOLUTION INTRAMUSCULAR; INTRAVENOUS EVERY 5 MIN PRN
Status: DISCONTINUED | OUTPATIENT
Start: 2024-03-25 | End: 2024-03-26 | Stop reason: HOSPADM

## 2024-03-25 RX ORDER — OXYCODONE HYDROCHLORIDE 5 MG/1
5-10 TABLET ORAL EVERY 4 HOURS PRN
Qty: 26 TABLET | Refills: 0 | Status: SHIPPED | OUTPATIENT
Start: 2024-03-25

## 2024-03-25 RX ORDER — SODIUM CHLORIDE, SODIUM LACTATE, POTASSIUM CHLORIDE, CALCIUM CHLORIDE 600; 310; 30; 20 MG/100ML; MG/100ML; MG/100ML; MG/100ML
INJECTION, SOLUTION INTRAVENOUS CONTINUOUS
Status: DISCONTINUED | OUTPATIENT
Start: 2024-03-25 | End: 2024-03-26 | Stop reason: HOSPADM

## 2024-03-25 RX ORDER — OXYCODONE HYDROCHLORIDE 5 MG/1
5 TABLET ORAL EVERY 4 HOURS PRN
Status: DISCONTINUED | OUTPATIENT
Start: 2024-03-25 | End: 2024-03-26 | Stop reason: HOSPADM

## 2024-03-25 RX ORDER — FENTANYL CITRATE 50 UG/ML
25 INJECTION, SOLUTION INTRAMUSCULAR; INTRAVENOUS
Status: DISCONTINUED | OUTPATIENT
Start: 2024-03-25 | End: 2024-03-26 | Stop reason: HOSPADM

## 2024-03-25 RX ORDER — HYDROXYZINE PAMOATE 25 MG/1
CAPSULE ORAL
Qty: 40 CAPSULE | Refills: 0 | Status: SHIPPED | OUTPATIENT
Start: 2024-03-25

## 2024-03-25 RX ORDER — PROPOFOL 10 MG/ML
INJECTION, EMULSION INTRAVENOUS CONTINUOUS PRN
Status: DISCONTINUED | OUTPATIENT
Start: 2024-03-25 | End: 2024-03-25

## 2024-03-25 RX ADMIN — ONDANSETRON 4 MG: 2 INJECTION INTRAMUSCULAR; INTRAVENOUS at 13:28

## 2024-03-25 RX ADMIN — LIDOCAINE HYDROCHLORIDE 40 MG: 20 INJECTION, SOLUTION INFILTRATION; PERINEURAL at 12:07

## 2024-03-25 RX ADMIN — BUPIVACAINE HYDROCHLORIDE 10 ML: 5 INJECTION, SOLUTION EPIDURAL; INTRACAUDAL at 11:55

## 2024-03-25 RX ADMIN — SODIUM CHLORIDE, SODIUM LACTATE, POTASSIUM CHLORIDE, CALCIUM CHLORIDE: 600; 310; 30; 20 INJECTION, SOLUTION INTRAVENOUS at 11:38

## 2024-03-25 RX ADMIN — FENTANYL CITRATE 50 MCG: 50 INJECTION, SOLUTION INTRAMUSCULAR; INTRAVENOUS at 11:48

## 2024-03-25 RX ADMIN — FENTANYL CITRATE 50 MCG: 50 INJECTION, SOLUTION INTRAMUSCULAR; INTRAVENOUS at 11:46

## 2024-03-25 RX ADMIN — ACETAMINOPHEN 975 MG: 325 TABLET ORAL at 11:12

## 2024-03-25 RX ADMIN — PROPOFOL 150 MCG/KG/MIN: 10 INJECTION, EMULSION INTRAVENOUS at 12:07

## 2024-03-25 RX ADMIN — CEFAZOLIN SODIUM 2 G: 2 SOLUTION INTRAVENOUS at 12:04

## 2024-03-25 RX ADMIN — BUPIVACAINE HYDROCHLORIDE 10 ML: 5 INJECTION, SOLUTION EPIDURAL; INTRACAUDAL at 11:45

## 2024-03-25 RX ADMIN — PROPOFOL 200 MG: 10 INJECTION, EMULSION INTRAVENOUS at 12:07

## 2024-03-25 NOTE — ANESTHESIA PROCEDURE NOTES
Sciatic Procedure Note    Pre-Procedure   Staff -        Anesthesiologist:  Rey Meza MD       Performed By: anesthesiologist       Location: pre-op       Procedure Start/Stop Times: 3/25/2024 11:45 AM and 3/25/2024 11:55 AM       Pre-Anesthestic Checklist: patient identified, IV checked, site marked, risks and benefits discussed, informed consent, monitors and equipment checked, pre-op evaluation, at physician/surgeon's request and post-op pain management  Timeout:       Correct Patient: Yes        Correct Procedure: Yes        Correct Site: Yes        Correct Position: Yes        Correct Laterality: Yes        Site Marked: Yes  Procedure Documentation  Procedure: Sciatic       Laterality: right       Patient Position: supine       Patient Prep/Sterile Barriers: sterile gloves, mask       Skin prep: Chloraprep (popliteal approach).       Needle Type: insulated and short bevel       Needle Gauge: 20.        Needle Length (millimeters): 100        Ultrasound guided       1. Ultrasound was used to identify targeted nerve, plexus, vascular marker, or fascial plane and place a needle adjacent to it in real-time.       2. Ultrasound was used to visualize the spread of anesthetic in close proximity to the above referenced structure.       3. A permanent image is entered into the patient's record.       4. The visualized anatomic structures appeared normal.       5. There were no apparent abnormal pathologic findings.    Assessment/Narrative         The placement was negative for: blood aspirated, painful injection and site bleeding       Paresthesias: No.       Bolus given via needle..        Secured via.        Insertion/Infusion Method: Single Shot       Complications: none       Injection made incrementally with aspirations every 3 mL.    Medication(s) Administered   Bupivacaine 0.5% PF (Infiltration) - Infiltration   10 mL - 3/25/2024 11:45:00 AM  Bupivacaine liposome (Exparel) 1.3% LA inj susp (Infiltration)  "- Infiltration   10 mL - 3/25/2024 11:45:00 AM  Medication Administration Time: 3/25/2024 11:45 AM      FOR [facility] ([facility]) ONLY:   Pain Team Contact information: please page the Pain Team Via [employer]. Search \"Pain\". During daytime hours, please page the attending first. At night please page the resident first.      "

## 2024-03-25 NOTE — ANESTHESIA PROCEDURE NOTES
Adductor canal Procedure Note    Pre-Procedure   Staff -        Anesthesiologist:  Rey Meza MD       Performed By: anesthesiologist       Location: pre-op       Procedure Start/Stop Times: 3/25/2024 11:55 AM and 3/25/2024 11:57 AM       Pre-Anesthestic Checklist: patient identified, IV checked, site marked, risks and benefits discussed, informed consent, monitors and equipment checked, pre-op evaluation, at physician/surgeon's request and post-op pain management  Timeout:       Correct Patient: Yes        Correct Procedure: Yes        Correct Site: Yes        Correct Position: Yes        Correct Laterality: Yes        Site Marked: Yes  Procedure Documentation  Procedure: Adductor canal       Laterality: right       Patient Position: supine       Patient Prep/Sterile Barriers: sterile gloves, mask       Skin prep: Chloraprep       Needle Type: insulated and short bevel       Needle Gauge: 20.        Needle Length (millimeters): 100        Ultrasound guided       1. Ultrasound was used to identify targeted nerve, plexus, vascular marker, or fascial plane and place a needle adjacent to it in real-time.       2. Ultrasound was used to visualize the spread of anesthetic in close proximity to the above referenced structure.       3. A permanent image is entered into the patient's record.       4. The visualized anatomic structures appeared normal.       5. There were no apparent abnormal pathologic findings.    Assessment/Narrative         The placement was negative for: blood aspirated, painful injection and site bleeding       Paresthesias: No.       Bolus given via needle..        Secured via.        Insertion/Infusion Method: Single Shot       Complications: none       Injection made incrementally with aspirations every 3 mL.    Medication(s) Administered   Bupivacaine 0.5% PF (Infiltration) - Infiltration   10 mL - 3/25/2024 11:55:00 AM  Bupivacaine liposome (Exparel) 1.3% LA inj susp (Infiltration) -  "Infiltration   10 mL - 3/25/2024 11:55:00 AM  Medication Administration Time: 3/25/2024 11:55 AM      FOR [facility] ([facility]) ONLY:   Pain Team Contact information: please page the Pain Team Via [employer]. Search \"Pain\". During daytime hours, please page the attending first. At night please page the resident first.      "

## 2024-03-25 NOTE — ANESTHESIA POSTPROCEDURE EVALUATION
Patient: Mitzi Palomino Abajb    Procedure: Procedure(s):  OPEN REDUCTION INTERNAL FIXATION, FRACTURE, ANKLE  possible deltoid ligament repair       Anesthesia Type:  Peripheral Nerve Block    Note:  Disposition: Outpatient   Postop Pain Control: Uneventful            Sign Out: Well controlled pain   PONV: No   Neuro/Psych: Uneventful            Sign Out: Acceptable/Baseline neuro status   Airway/Respiratory: Uneventful            Sign Out: Acceptable/Baseline resp. status   CV/Hemodynamics: Uneventful            Sign Out: Acceptable CV status; No obvious hypovolemia; No obvious fluid overload   Other NRE:    DID A NON-ROUTINE EVENT OCCUR? No           Last vitals:  Vitals Value Taken Time   /70 03/25/24 1415   Temp 97.6  F (36.4  C) 03/25/24 1404   Pulse 79 03/25/24 1415   Resp 18 03/25/24 1415   SpO2 99 % 03/25/24 1415       Electronically Signed By: Rey Meza MD  March 25, 2024  2:35 PM

## 2024-03-25 NOTE — ANESTHESIA CARE TRANSFER NOTE
Patient: Mitzi See    Procedure: Procedure(s):  OPEN REDUCTION INTERNAL FIXATION, FRACTURE, ANKLE  possible deltoid ligament repair       Diagnosis: Closed fracture of right ankle, initial encounter [S82.407Y]  Diagnosis Additional Information: No value filed.    Anesthesia Type:   Peripheral Nerve Block     Note:    Oropharynx: oropharynx clear of all foreign objects and spontaneously breathing  Level of Consciousness: drowsy  Oxygen Supplementation: face mask  Level of Supplemental Oxygen (L/min / FiO2): 4  Independent Airway: airway patency satisfactory and stable  Dentition: dentition unchanged  Vital Signs Stable: post-procedure vital signs reviewed and stable  Report to RN Given: handoff report given  Patient transferred to: PACU    Handoff Report: Identifed the Patient, Identified the Reponsible Provider, Reviewed the pertinent medical history, Discussed the surgical course, Reviewed Intra-OP anesthesia mangement and issues during anesthesia, Set expectations for post-procedure period and Allowed opportunity for questions and acknowledgement of understanding    Vitals:  Vitals Value Taken Time   BP     Temp     Pulse 70 03/25/24 1404   Resp 19 03/25/24 1404   SpO2 100 % 03/25/24 1404   Vitals shown include unfiled device data.    Electronically Signed By: ANALIA Goncalves CRNA  March 25, 2024  2:06 PM

## 2024-03-25 NOTE — DISCHARGE INSTRUCTIONS
You had 975 mg of Tylenol at 11:15 AM. You may repeat this after 5:15 PM. Maximum amount of Tylenol/Acetaminophen in a 24 hour period is 4,000 mg.

## 2024-03-25 NOTE — ANESTHESIA PROCEDURE NOTES
Airway       Patient location during procedure: OR  Staff -        Performed By: CRNAIndications and Patient Condition       Indications for airway management: shelton-procedural       Induction type:intravenous       Mask difficulty assessment: 1 - vent by mask    Final Airway Details       Final airway type: supraglottic airway    Supraglottic Airway Details        Type: LMA       LMA size: 4    Post intubation assessment        Placement verified by: capnometry, equal breath sounds and chest rise        Number of attempts at approach: 1       Number of other approaches attempted: 0       Ease of procedure: easy       Dentition: Intact

## 2024-04-02 ENCOUNTER — DOCUMENTATION ONLY (OUTPATIENT)
Dept: ORTHOPEDICS | Facility: CLINIC | Age: 29
End: 2024-04-02
Payer: COMMERCIAL

## 2024-04-02 DIAGNOSIS — S82.891D FRACTURE OF ANKLE, CLOSED, RIGHT, WITH ROUTINE HEALING, SUBSEQUENT ENCOUNTER: Primary | ICD-10-CM

## 2024-04-10 ENCOUNTER — TELEPHONE (OUTPATIENT)
Dept: URGENT CARE | Facility: URGENT CARE | Age: 29
End: 2024-04-10
Payer: COMMERCIAL

## 2024-04-10 NOTE — TELEPHONE ENCOUNTER
Called and scheduled patient for post op with Kareem Carranza PA-C on 4/12 in [place].     Kandi Jim MS, ATC  Certified Athletic Trainer

## 2024-04-10 NOTE — TELEPHONE ENCOUNTER
Patient Returning Call    Reason for call:  pt requesting callback regarding setting schedule up, refused next available, wants something closer    Information relayed to patient:  te sent to clinic     Patient has additional questions:  No      [facility] we [facility] you [facility] or would you prefer to receive a phone call?:   Patient would prefer a phone call   Okay to leave a detailed message?: Yes at Cell number on file:    Telephone Information:   Mobile [phone]

## 2024-04-12 ENCOUNTER — OFFICE VISIT (OUTPATIENT)
Dept: ORTHOPEDICS | Facility: CLINIC | Age: 29
End: 2024-04-12
Payer: COMMERCIAL

## 2024-04-12 ENCOUNTER — ANCILLARY PROCEDURE (OUTPATIENT)
Dept: GENERAL RADIOLOGY | Facility: CLINIC | Age: 29
End: 2024-04-12
Attending: PHYSICIAN ASSISTANT
Payer: COMMERCIAL

## 2024-04-12 DIAGNOSIS — Z98.890 S/P ORIF (OPEN REDUCTION INTERNAL FIXATION) FRACTURE: Primary | ICD-10-CM

## 2024-04-12 DIAGNOSIS — S82.891A CLOSED FRACTURE OF RIGHT ANKLE, INITIAL ENCOUNTER: Primary | ICD-10-CM

## 2024-04-12 DIAGNOSIS — S82.891A CLOSED FRACTURE OF RIGHT ANKLE, INITIAL ENCOUNTER: ICD-10-CM

## 2024-04-12 DIAGNOSIS — Z87.81 S/P ORIF (OPEN REDUCTION INTERNAL FIXATION) FRACTURE: Primary | ICD-10-CM

## 2024-04-12 PROCEDURE — 99024 POSTOP FOLLOW-UP VISIT: CPT | Performed by: PHYSICIAN ASSISTANT

## 2024-04-12 PROCEDURE — 73610 X-RAY EXAM OF ANKLE: CPT | Mod: RT | Performed by: RADIOLOGY

## 2024-04-12 ASSESSMENT — PAIN SCALES - GENERAL: PAINLEVEL: MILD PAIN (2)

## 2024-04-12 NOTE — LETTER
4/12/2024         RE: Mitzi See  [address] [address] [address]        Dear Colleague,    Thank you for referring your patient, Mitzi See, to the [facility]. Please see a copy of my visit note below.    CHIEF COMPLAINT:   Chief Complaint   Patient presents with     Surgical Followup     2wk s.p ORIF R ankle and Deltoid ligament repair.        SURGICAL PROCEDURE: open reduction and internal fixation right distal fibular fracture and Deltoid ligament repair     DATE OF PROCEDURE: 3/25/24    HISTORY OF PRESENT ILLNESS    Mitzi See is a 28 year old female seen for postoperative follow-up of a right distal fibula fracture and deltoid ligament tear.  Injury occurred on 3/4/24: Patient caught her right foot getting out of the car and twisted her right ankle and heard a pop. The ankle became swollen and she was unable to bear weight on it, prompting her to go to the emergency department. Since surgery, 18 days ago, pain has been well controlled. Denies fevers, chills, night sweats, numbness or tingling. No wound problems. Compliant with weight bearing restrictions and elevation. There have been no issues since surgery. Denies numbness or tingling.    Pain severity: 1/10    Other PMH:  has a past medical history of Closed fracture of distal lateral malleolus of ankle.    Surgical Hx:  has a past surgical history that includes Open reduction internal fixation ankle (Right, 3/25/2024) and Repair ligament ankle (Right, 3/25/2024).    Medications:   Current Outpatient Medications:      hydrOXYzine sammy (VISTARIL) 25 MG capsule, Take 1 capsule every 6 hours by mouth as needed to assist with pain control., Disp: 40 capsule, Rfl: 0     ondansetron (ZOFRAN ODT) 4 MG ODT tab, Take 1 tablet (4 mg) by mouth every 8 hours as needed for nausea, Disp: 4 tablet, Rfl: 0     oxyCODONE (ROXICODONE) 5 MG tablet, Take 1-2 tablets (5-10 mg) by mouth every 4 hours as needed for moderate  to severe pain, Disp: 26 tablet, Rfl: 0     senna-docusate (SENOKOT-S/PERICOLACE) 8.6-50 MG tablet, Take 1-2 tablets by mouth 2 times daily, Disp: 30 tablet, Rfl: 0    Allergies: No Known Allergies    Social Hx:  reports that she has never smoked. She has never used smokeless tobacco. She reports that she does not drink alcohol and does not use drugs.    Family Hx: family history is not on file.    REVIEW OF SYSTEMS:  CONSTITUTIONAL:NEGATIVE for fever, chills, change in weight  INTEGUMENTARY/SKIN: NEGATIVE for worrisome rashes, moles or lesions  MUSCULOSKELETAL:See HPI above  NEURO: NEGATIVE for weakness, dizziness or paresthesias      PHYSICAL EXAM:  [facility] 03/04/2024    GENERAL APPEARANCE: healthy, alert, no distress   SKIN: no suspicious lesions or rashes  NEURO: Normal strength and tone, mentation intact and speech normal  PSYCH:  mentation appears normal and affect normal/bright  RESPIRATORY: No increased work of breathing.      right LOWER EXTREMITY EXAM:  Surgical splint removed. Sutures removed.   Intact sensation deep peroneal nerve, superficial peroneal nerve, med/lat tibial nerve, sural nerve, saphenous nerve  Intact EHL, EDL, TA, FHL, GS, quadriceps hamstrings and hip flexors  Toes warm and well perfused, brisk capillary refill. Palpable 2+ dp pulses.  calf soft and nttp or squeeze.  No palpable popliteal lymphadenopathy.  Edema: 1+    X-RAY:  3 views right ankle from 4/12/2024 were reviewed in clinic today. IMPRESSION: Interval operative reduction and internal fixation of the left distal fibular fracture with a lateral plate and multiple screws. Hardware is well seated without loosening or other complication. Joint alignment is normal, without significant   subluxation. Question a minimal, 1 mm widening of the medial clear space relative to the lateral. Moderate circumferential soft tissue swelling in the ankle. Disuse osteopenia noted in the ankle..        Impression: 28 year old female  18 days   postoperative right open reduction and internal fixation right distal fibular fracture and Deltoid ligament repair , doing well.    Plan:  *Weight bearing status: non-weight bearing for another 2 weeks. OK to start ROM at this time.   *Physical Therapy referral to begin ROM placed  *Pain control: no more prescriptions required. OTC tylenol   *Immobilization: Tall CAM boot -non-weight bearing   *Elevation of affected extremity  *Return to clinic in 2 weeks, or sooner as needed. Plan repeat x-rays      Michael Carranza PA-C, CAQ-OS  Dept. of Orthopedic Surgery  [facility]            Again, thank you for allowing me to participate in the care of your patient.        Sincerely,        KIMBERLI Yun

## 2024-04-12 NOTE — PROGRESS NOTES
CHIEF COMPLAINT:   Chief Complaint   Patient presents with    Surgical Followup     2wk s.p ORIF R ankle and Deltoid ligament repair.        SURGICAL PROCEDURE: open reduction and internal fixation right distal fibular fracture and Deltoid ligament repair     DATE OF PROCEDURE: 3/25/24    HISTORY OF PRESENT ILLNESS    Mitzi See is a 28 year old female seen for postoperative follow-up of a right distal fibula fracture and deltoid ligament tear.  Injury occurred on 3/4/24: Patient caught her right foot getting out of the car and twisted her right ankle and heard a pop. The ankle became swollen and she was unable to bear weight on it, prompting her to go to the emergency department. Since surgery, 18 days ago, pain has been well controlled. Denies fevers, chills, night sweats, numbness or tingling. No wound problems. Compliant with weight bearing restrictions and elevation. There have been no issues since surgery. Denies numbness or tingling.    Pain severity: 1/10    Other PMH:  has a past medical history of Closed fracture of distal lateral malleolus of ankle.    Surgical Hx:  has a past surgical history that includes Open reduction internal fixation ankle (Right, 3/25/2024) and Repair ligament ankle (Right, 3/25/2024).    Medications:   Current Outpatient Medications:     hydrOXYzine sammy (VISTARIL) 25 MG capsule, Take 1 capsule every 6 hours by mouth as needed to assist with pain control., Disp: 40 capsule, Rfl: 0    ondansetron (ZOFRAN ODT) 4 MG ODT tab, Take 1 tablet (4 mg) by mouth every 8 hours as needed for nausea, Disp: 4 tablet, Rfl: 0    oxyCODONE (ROXICODONE) 5 MG tablet, Take 1-2 tablets (5-10 mg) by mouth every 4 hours as needed for moderate to severe pain, Disp: 26 tablet, Rfl: 0    senna-docusate (SENOKOT-S/PERICOLACE) 8.6-50 MG tablet, Take 1-2 tablets by mouth 2 times daily, Disp: 30 tablet, Rfl: 0    Allergies: No Known Allergies    Social Hx:  reports that she has never smoked. She has  never used smokeless tobacco. She reports that she does not drink alcohol and does not use drugs.    Family Hx: family history is not on file.    REVIEW OF SYSTEMS:  CONSTITUTIONAL:NEGATIVE for fever, chills, change in weight  INTEGUMENTARY/SKIN: NEGATIVE for worrisome rashes, moles or lesions  MUSCULOSKELETAL:See HPI above  NEURO: NEGATIVE for weakness, dizziness or paresthesias      PHYSICAL EXAM:  [facility] 03/04/2024    GENERAL APPEARANCE: healthy, alert, no distress   SKIN: no suspicious lesions or rashes  NEURO: Normal strength and tone, mentation intact and speech normal  PSYCH:  mentation appears normal and affect normal/bright  RESPIRATORY: No increased work of breathing.      right LOWER EXTREMITY EXAM:  Surgical splint removed. Sutures removed.   Intact sensation deep peroneal nerve, superficial peroneal nerve, med/lat tibial nerve, sural nerve, saphenous nerve  Intact EHL, EDL, TA, FHL, GS, quadriceps hamstrings and hip flexors  Toes warm and well perfused, brisk capillary refill. Palpable 2+ dp pulses.  calf soft and nttp or squeeze.  No palpable popliteal lymphadenopathy.  Edema: 1+    X-RAY:  3 views right ankle from 4/12/2024 were reviewed in clinic today. IMPRESSION: Interval operative reduction and internal fixation of the left distal fibular fracture with a lateral plate and multiple screws. Hardware is well seated without loosening or other complication. Joint alignment is normal, without significant   subluxation. Question a minimal, 1 mm widening of the medial clear space relative to the lateral. Moderate circumferential soft tissue swelling in the ankle. Disuse osteopenia noted in the ankle..        Impression: 28 year old female  18 days  postoperative right open reduction and internal fixation right distal fibular fracture and Deltoid ligament repair , doing well.    Plan:  *Weight bearing status: non-weight bearing for another 2 weeks. OK to start ROM at this time.   *Physical Therapy referral to  begin ROM placed  *Pain control: no more prescriptions required. OTC tylenol   *Immobilization: Tall CAM boot -non-weight bearing   *Elevation of affected extremity  *Return to clinic in 2 weeks, or sooner as needed. Plan repeat x-rays      Michael Carranza PA-C, CAQ-OS  Dept. of Orthopedic Surgery  [facility]'

## 2024-04-17 NOTE — PROGRESS NOTES
CHIEF COMPLAINT:   No chief complaint on file.      SURGICAL PROCEDURE: open reduction and internal fixation right distal fibular fracture and Deltoid ligament repair     DATE OF PROCEDURE: 3/25/24, nearly 5 weeks out form surgery now    HISTORY OF PRESENT ILLNESS    Mitzi See is a 28 year old female seen for postoperative follow-up of a right distal fibula fracture and deltoid ligament tear.  Injury occurred on 3/4/24: Patient caught her right foot getting out of the car and twisted her right ankle and heard a pop. The ankle became swollen and she was unable to bear weight on it, prompting her to go to the emergency department. Since surgery, 18 days ago, pain has been well controlled. Denies fevers, chills, night sweats, numbness or tingling. No wound problems. Compliant with weight bearing restrictions and elevation. There have been no issues since surgery or last clinic visit. Denies numbness or tingling.    Pain severity: 1/10    Other PMH:  has a past medical history of Closed fracture of distal lateral malleolus of ankle.    Surgical Hx:  has a past surgical history that includes Open reduction internal fixation ankle (Right, 3/25/2024) and Repair ligament ankle (Right, 3/25/2024).    Medications:   Current Outpatient Medications:     hydrOXYzine sammy (VISTARIL) 25 MG capsule, Take 1 capsule every 6 hours by mouth as needed to assist with pain control., Disp: 40 capsule, Rfl: 0    ondansetron (ZOFRAN ODT) 4 MG ODT tab, Take 1 tablet (4 mg) by mouth every 8 hours as needed for nausea, Disp: 4 tablet, Rfl: 0    oxyCODONE (ROXICODONE) 5 MG tablet, Take 1-2 tablets (5-10 mg) by mouth every 4 hours as needed for moderate to severe pain, Disp: 26 tablet, Rfl: 0    senna-docusate (SENOKOT-S/PERICOLACE) 8.6-50 MG tablet, Take 1-2 tablets by mouth 2 times daily, Disp: 30 tablet, Rfl: 0    Allergies: No Known Allergies    Social Hx:  reports that she has never smoked. She has never used smokeless tobacco. She  reports that she does not drink alcohol and does not use drugs.    Family Hx: family history is not on file.    REVIEW OF SYSTEMS:  CONSTITUTIONAL:NEGATIVE for fever, chills, change in weight  INTEGUMENTARY/SKIN: NEGATIVE for worrisome rashes, moles or lesions  MUSCULOSKELETAL:See HPI above  NEURO: NEGATIVE for weakness, dizziness or paresthesias      PHYSICAL EXAM:  [facility] 03/04/2024    GENERAL APPEARANCE: healthy, alert, no distress   SKIN: no suspicious lesions or rashes  NEURO: Normal strength and tone, mentation intact and speech normal  PSYCH:  mentation appears normal and affect normal/bright  RESPIRATORY: No increased work of breathing.      right LOWER EXTREMITY EXAM:  Surgical splint removed. Sutures removed.   Intact sensation deep peroneal nerve, superficial peroneal nerve, med/lat tibial nerve, sural nerve, saphenous nerve  Intact EHL, EDL, TA, FHL, GS, quadriceps hamstrings and hip flexors  Toes warm and well perfused, brisk capillary refill. Palpable 2+ dp pulses.  calf soft and nttp or squeeze.  No palpable popliteal lymphadenopathy.  Edema: 1+    X-RAY:  3 views right ankle from 4/26/2024 were reviewed in clinic today. IMPRESSION: Interval operative reduction and internal fixation of the left distal fibular fracture with a lateral plate and multiple screws. Hardware is well seated without loosening or other complication. Joint alignment is normal. Some minimal interval healing  subluxation. Question a minimal, 1 mm widening of the medial clear space relative to the lateral. Disuse osteopenia noted in the ankle..    Impression: 28 year old female nearly 5 weeks  postoperative right open reduction and internal fixation right distal fibular fracture and deltoid ligament repair, doing well.    Plan:  *Weight bearing status: partial weight bearing in boot with crutches  *Physical Therapy - Continue. Aggressive ROM, progress weight bearing in boot  *Pain control: no more prescriptions required. OTC tylenol    *Immobilization: continue Tall CAM boot. Partial weight bearing, progress to full over next 2 weeks.  *Elevation of affected extremity  *Return to clinic in 2 weeks, or sooner as needed. Plan repeat x-rays and to convert to ankle brace. *Advised patient and spouse to bring well fitting tennis shoes to next appointment.   *All questions discussed and answered today.      Michael Carranza PA-C, CAQ-OS  Dept. of Orthopedic Surgery  [facility]'

## 2024-04-18 ENCOUNTER — THERAPY VISIT (OUTPATIENT)
Dept: PHYSICAL THERAPY | Facility: CLINIC | Age: 29
End: 2024-04-18
Attending: PHYSICIAN ASSISTANT
Payer: COMMERCIAL

## 2024-04-18 DIAGNOSIS — Z98.890 S/P ORIF (OPEN REDUCTION INTERNAL FIXATION) FRACTURE: ICD-10-CM

## 2024-04-18 DIAGNOSIS — Z87.81 S/P ORIF (OPEN REDUCTION INTERNAL FIXATION) FRACTURE: ICD-10-CM

## 2024-04-18 PROCEDURE — 97161 PT EVAL LOW COMPLEX 20 MIN: CPT | Mod: GP | Performed by: PHYSICAL THERAPIST

## 2024-04-18 PROCEDURE — 97110 THERAPEUTIC EXERCISES: CPT | Mod: GP | Performed by: PHYSICAL THERAPIST

## 2024-04-18 NOTE — PROGRESS NOTES
PHYSICAL THERAPY EVALUATION  Type of Visit: Evaluation    See electronic medical record for Abuse and Falls Screening details.    Subjective     REFERRAL DX: S/P ORIF (open reduction internal fixation) fracture 3/4/24    SUBJECTIVE HISTORY:  Patient presents s/p ORIF R distal fibular fracture and deltoid ligament repair. JOSHUA:  Patient was getting out of her car and twister her R ankle and heard a pop. Currently precautions: NWB for additional 2 weeks (4/26/2024), okay for ROM.     Feeling much better since last week. No longer taking any pain medications .    PAIN:  At worst: 6/10  At best: 1/10       Objective         Presenting condition or subjective complaint: injury ankle  Date of onset: 03/04/24    Relevant medical history:     Dates & types of surgery:      Prior diagnostic imaging/testing results: X-ray; MRI     Prior therapy history for the same diagnosis, illness or injury: No      Living Environment  Social support: With family members   Type of home: House   Stairs to enter the home: Yes 4 Is there a railing: No   Ramp: No   Stairs inside the home: Yes   Is there a railing: No   Help at home: Self Cares (home health aide/personal care attendant, family, etc)  Equipment owned: Crutches     Employment: Yes   Hobbies/Interests: Reading, walking, helping/feeding homeless, spending time with family and friends, hiking    Patient goals for therapy: march 4    FOOT AND ANKLE OBJECTIVE  GAIT: NWB  ROM:   (Degrees) Left AROM Right AROM*   Ankle Dorsiflexion 5 0   Ankle Plantarflexion 50 30   Ankle Inversion 40 15   Ankle Eversion 25 10     GIRTH MEASURE R/L: 50cm/51cm  STRENGTH: Not tested  Incision: CDI, scarring with no signs of infection    Assessment & Plan   CLINICAL IMPRESSIONS  Medical Diagnosis: s/p R ankle ORIF with deltoid ligament repair    Treatment Diagnosis: R ankle pain, R ankle stiffness, R ankle weakness   Impression/Assessment: Patient is a 28 year old female with R ankle  complaints.  The following significant findings have been identified: Pain, Decreased ROM/flexibility, Decreased joint mobility, Decreased strength, Impaired sensation, Inflammation, Edema, Impaired gait, Impaired muscle performance, and Decreased activity tolerance. These impairments interfere with their ability to perform self care tasks, recreational activities, household chores, driving , household mobility, and community mobility as compared to previous level of function.     Clinical Decision Making (Complexity):  Clinical Presentation: Stable/Uncomplicated  Clinical Presentation Rationale: based on medical and personal factors listed in PT evaluation  Clinical Decision Making (Complexity): Low complexity    PLAN OF CARE  Treatment Interventions:  Modalities: Cryotherapy, E-stim, Hot Pack  Interventions: Gait Training, Manual Therapy, Neuromuscular Re-education, Therapeutic Activity, Therapeutic Exercise, Self-Care/Home Management    Long Term Goals     PT Goal 1  Goal Description: Patient will demonstrate >= 5 degrees of R ankle DF  Rationale: to maximize safety and independence with performance of ADLs and functional tasks;to maximize safety and independence within the community;to maximize safety and independence within the home  Target Date: 07/11/24  PT Goal 2  Goal Description: Patient will demonstrate ability to ambulate without compensations  Rationale: to maximize safety and independence within the community;to maximize safety and independence within the home  Target Date: 07/11/24      Frequency of Treatment: 1x/week  Duration of Treatment: 12 weeks    Education Assessment:   Learner/Method: Patient  Education Comments: PTRx    Risks and benefits of evaluation/treatment have been explained.   Patient/Family/caregiver agrees with Plan of Care.     Evaluation Time:     PT Eval, Low Complexity Minutes (04913): 14       Signing Clinician: She Golden PT        [facility]                                                                                    OUTPATIENT PHYSICAL THERAPY      PLAN OF TREATMENT FOR OUTPATIENT REHABILITATION   Patient's Last Name, First Name, Mitzi Ambriz YOB: 1995   Provider's Name   [facility]   Medical Record No.  [id number]     Onset Date: 03/04/24  Start of Care Date: 04/18/24     Medical Diagnosis:  s/p R ankle ORIF with deltoid ligament repair      PT Treatment Diagnosis:  R ankle pain, R ankle stiffness, R ankle weakness Plan of Treatment  Frequency/Duration: 1x/week/ 12 weeks    Certification date from 04/18/24 to 07/11/24         See note for plan of treatment details and functional goals     She Golden, PT                         I CERTIFY THE NEED FOR THESE SERVICES FURNISHED UNDER        THIS PLAN OF TREATMENT AND WHILE UNDER MY CARE     (Physician attestation of this document indicates review and certification of the therapy plan).              Referring Provider:  Michael Carranza    Initial Assessment  See Epic Evaluation- Start of Care Date: 04/18/24

## 2024-04-22 DIAGNOSIS — S82.891A CLOSED FRACTURE OF RIGHT ANKLE, INITIAL ENCOUNTER: Primary | ICD-10-CM

## 2024-04-26 ENCOUNTER — OFFICE VISIT (OUTPATIENT)
Dept: ORTHOPEDICS | Facility: CLINIC | Age: 29
End: 2024-04-26
Payer: COMMERCIAL

## 2024-04-26 ENCOUNTER — ANCILLARY PROCEDURE (OUTPATIENT)
Dept: GENERAL RADIOLOGY | Facility: CLINIC | Age: 29
End: 2024-04-26
Attending: PHYSICIAN ASSISTANT
Payer: COMMERCIAL

## 2024-04-26 DIAGNOSIS — Z87.81 S/P ORIF (OPEN REDUCTION INTERNAL FIXATION) FRACTURE: Primary | ICD-10-CM

## 2024-04-26 DIAGNOSIS — S82.891A CLOSED FRACTURE OF RIGHT ANKLE, INITIAL ENCOUNTER: ICD-10-CM

## 2024-04-26 DIAGNOSIS — Z98.890 S/P ORIF (OPEN REDUCTION INTERNAL FIXATION) FRACTURE: Primary | ICD-10-CM

## 2024-04-26 PROCEDURE — 73610 X-RAY EXAM OF ANKLE: CPT | Mod: RT | Performed by: RADIOLOGY

## 2024-04-26 PROCEDURE — 99024 POSTOP FOLLOW-UP VISIT: CPT | Performed by: PHYSICIAN ASSISTANT

## 2024-04-26 ASSESSMENT — PAIN SCALES - GENERAL: PAINLEVEL: NO PAIN (1)

## 2024-04-26 NOTE — LETTER
4/26/2024         RE: Mitzi See  [id number] [facility] [id number]        Dear Colleague,    Thank you for referring your patient, Mitzi See, to the [facility]. Please see a copy of my visit note below.    CHIEF COMPLAINT:   No chief complaint on file.      SURGICAL PROCEDURE: open reduction and internal fixation right distal fibular fracture and Deltoid ligament repair     DATE OF PROCEDURE: 3/25/24, nearly 5 weeks out form surgery now    HISTORY OF PRESENT ILLNESS    Mitzi See is a 28 year old female seen for postoperative follow-up of a right distal fibula fracture and deltoid ligament tear.  Injury occurred on 3/4/24: Patient caught her right foot getting out of the car and twisted her right ankle and heard a pop. The ankle became swollen and she was unable to bear weight on it, prompting her to go to the emergency department. Since surgery, 18 days ago, pain has been well controlled. Denies fevers, chills, night sweats, numbness or tingling. No wound problems. Compliant with weight bearing restrictions and elevation. There have been no issues since surgery or last clinic visit. Denies numbness or tingling.    Pain severity: 1/10    Other PMH:  has a past medical history of Closed fracture of distal lateral malleolus of ankle.    Surgical Hx:  has a past surgical history that includes Open reduction internal fixation ankle (Right, 3/25/2024) and Repair ligament ankle (Right, 3/25/2024).    Medications:   Current Outpatient Medications:      hydrOXYzine sammy (VISTARIL) 25 MG capsule, Take 1 capsule every 6 hours by mouth as needed to assist with pain control., Disp: 40 capsule, Rfl: 0     ondansetron (ZOFRAN ODT) 4 MG ODT tab, Take 1 tablet (4 mg) by mouth every 8 hours as needed for nausea, Disp: 4 tablet, Rfl: 0     oxyCODONE (ROXICODONE) 5 MG tablet, Take 1-2 tablets (5-10 mg) by mouth every 4 hours as needed for moderate to severe pain, Disp: 26 tablet,  Rfl: 0     senna-docusate (SENOKOT-S/PERICOLACE) 8.6-50 MG tablet, Take 1-2 tablets by mouth 2 times daily, Disp: 30 tablet, Rfl: 0    Allergies: No Known Allergies    Social Hx:  reports that she has never smoked. She has never used smokeless tobacco. She reports that she does not drink alcohol and does not use drugs.    Family Hx: family history is not on file.    REVIEW OF SYSTEMS:  CONSTITUTIONAL:NEGATIVE for fever, chills, change in weight  INTEGUMENTARY/SKIN: NEGATIVE for worrisome rashes, moles or lesions  MUSCULOSKELETAL:See HPI above  NEURO: NEGATIVE for weakness, dizziness or paresthesias      PHYSICAL EXAM:  [facility] 03/04/2024    GENERAL APPEARANCE: healthy, alert, no distress   SKIN: no suspicious lesions or rashes  NEURO: Normal strength and tone, mentation intact and speech normal  PSYCH:  mentation appears normal and affect normal/bright  RESPIRATORY: No increased work of breathing.      right LOWER EXTREMITY EXAM:  Surgical splint removed. Sutures removed.   Intact sensation deep peroneal nerve, superficial peroneal nerve, med/lat tibial nerve, sural nerve, saphenous nerve  Intact EHL, EDL, TA, FHL, GS, quadriceps hamstrings and hip flexors  Toes warm and well perfused, brisk capillary refill. Palpable 2+ dp pulses.  calf soft and nttp or squeeze.  No palpable popliteal lymphadenopathy.  Edema: 1+    X-RAY:  3 views right ankle from 4/26/2024 were reviewed in clinic today. IMPRESSION: Interval operative reduction and internal fixation of the left distal fibular fracture with a lateral plate and multiple screws. Hardware is well seated without loosening or other complication. Joint alignment is normal. Some minimal interval healing  subluxation. Question a minimal, 1 mm widening of the medial clear space relative to the lateral. Disuse osteopenia noted in the ankle..    Impression: 28 year old female nearly 5 weeks  postoperative right open reduction and internal fixation right distal fibular fracture and  deltoid ligament repair, doing well.    Plan:  *Weight bearing status: partial weight bearing in boot with crutches  *Physical Therapy - Continue. Aggressive ROM, progress weight bearing in boot  *Pain control: no more prescriptions required. OTC tylenol   *Immobilization: continue Tall CAM boot. Partial weight bearing, progress to full over next 2 weeks.  *Elevation of affected extremity  *Return to clinic in 2 weeks, or sooner as needed. Plan repeat x-rays and to convert to ankle brace. *Advised patient and spouse to bring well fitting tennis shoes to next appointment.   *All questions discussed and answered today.      Michael Carranza PA-C, CAQ-OS  Dept. of Orthopedic Surgery  [facility]            Again, thank you for allowing me to participate in the care of your patient.        Sincerely,        KIMBERLI Yun

## 2024-05-07 NOTE — PROGRESS NOTES
SURGICAL PROCEDURE: open reduction and internal fixation right distal fibular fracture and Deltoid ligament repair with Dr. Inge Rajput    DATE OF PROCEDURE: 3/25/24, nearly 7 weeks out form surgery now    HISTORY OF PRESENT ILLNESS  Mitzi See is a 28 year old female seen for postoperative follow-up of a right distal fibula fracture and deltoid ligament tear with Dr. Inge Rajput. Since surgery pain has been well controlled. Denies fevers, chills, night sweats, numbness or tingling. No wound problems. Compliant with weight bearing restrictions and elevation. There have been no issues since surgery or last clinic visit. Denies numbness or tingling. Presents today with spouse, nearly full weight bearing in tall boot with 1 crutch under right arm.    Pain severity: 1/10    Other PMH:  has a past medical history of Closed fracture of distal lateral malleolus of ankle.    Surgical Hx:  has a past surgical history that includes Open reduction internal fixation ankle (Right, 3/25/2024) and Repair ligament ankle (Right, 3/25/2024).    Medications:   Current Outpatient Medications:     hydrOXYzine sammy (VISTARIL) 25 MG capsule, Take 1 capsule every 6 hours by mouth as needed to assist with pain control., Disp: 40 capsule, Rfl: 0    ondansetron (ZOFRAN ODT) 4 MG ODT tab, Take 1 tablet (4 mg) by mouth every 8 hours as needed for nausea, Disp: 4 tablet, Rfl: 0    oxyCODONE (ROXICODONE) 5 MG tablet, Take 1-2 tablets (5-10 mg) by mouth every 4 hours as needed for moderate to severe pain, Disp: 26 tablet, Rfl: 0    senna-docusate (SENOKOT-S/PERICOLACE) 8.6-50 MG tablet, Take 1-2 tablets by mouth 2 times daily, Disp: 30 tablet, Rfl: 0    Allergies: No Known Allergies    Social Hx:  reports that she has never smoked. She has never used smokeless tobacco. She reports that she does not drink alcohol and does not use drugs.    Family Hx: family history is not on file.    REVIEW OF SYSTEMS:  CONSTITUTIONAL:NEGATIVE for fever,  chills, change in weight  INTEGUMENTARY/SKIN: NEGATIVE for worrisome rashes, moles or lesions  MUSCULOSKELETAL:See HPI above  NEURO: NEGATIVE for weakness, dizziness or paresthesias      PHYSICAL EXAM:  GENERAL APPEARANCE: healthy, alert, no distress   SKIN: no suspicious lesions or rashes  NEURO: Normal strength and tone, mentation intact and speech normal  PSYCH:  mentation appears normal and affect normal/bright  RESPIRATORY: No increased work of breathing.      right LOWER EXTREMITY EXAM:  Intact sensation deep peroneal nerve, superficial peroneal nerve, med/lat tibial nerve, sural nerve, saphenous nerve  Intact EHL, EDL, TA, FHL, GS, quadriceps hamstrings and hip flexors  Toes warm and well perfused, brisk capillary refill. Palpable 2+ dp pulses.  calf soft and nttp or squeeze.  No palpable popliteal lymphadenopathy.  Edema: 1+    X-RAY:  3 views right ankle from 5/10/2024 were reviewed in clinic today. IMPRESSION: Interval operative reduction and internal fixation of the left distal fibular fracture with a lateral plate and multiple screws. Hardware is well seated without loosening or other complication. Joint alignment is normal. interval healing noted across fracture.    Impression: 28 year old female nearly 7 weeks  postoperative right open reduction and internal fixation right distal fibular fracture and deltoid ligament repair with Regulo Jacobson, doing well.    Plan:  *Weight bearing status: can discontinue tall boot and convert to ankle brace and shoe with crutch, per comfort. Transition to full weight bearing in brace and shoe  *Physical Therapy - Continue. Aggressive ROM, progress weight bearing in brace  *Pain control: no more prescriptions required. OTC tylenol   *Immobilization: Ankle brace - sweedo type, and well fitting tennis shoe. Transition to full weight bearing, as able per comfort  *Discussed carrying 1 crutch in contralateral, left, arm. Discussed rationale and demonstrated.  *Elevation of  affected extremity  *Return to clinic in 4 weeks, or sooner as needed. Anticipate her walking in to clinic unassisted, reciprocal gait, in brace and tennis shoe. Plan repeat x-rays.    Michael Carranza PA-C, CAQ-OS  Dept. of Orthopedic Surgery  [facility]'

## 2024-05-08 DIAGNOSIS — Z87.81 S/P ORIF (OPEN REDUCTION INTERNAL FIXATION) FRACTURE: Primary | ICD-10-CM

## 2024-05-08 DIAGNOSIS — Z98.890 S/P ORIF (OPEN REDUCTION INTERNAL FIXATION) FRACTURE: Primary | ICD-10-CM

## 2024-05-09 ENCOUNTER — THERAPY VISIT (OUTPATIENT)
Dept: PHYSICAL THERAPY | Facility: CLINIC | Age: 29
End: 2024-05-09
Payer: COMMERCIAL

## 2024-05-09 DIAGNOSIS — Z87.81 S/P ORIF (OPEN REDUCTION INTERNAL FIXATION) FRACTURE: Primary | ICD-10-CM

## 2024-05-09 DIAGNOSIS — Z98.890 S/P ORIF (OPEN REDUCTION INTERNAL FIXATION) FRACTURE: Primary | ICD-10-CM

## 2024-05-09 PROCEDURE — 97110 THERAPEUTIC EXERCISES: CPT | Mod: GP

## 2024-05-10 ENCOUNTER — OFFICE VISIT (OUTPATIENT)
Dept: ORTHOPEDICS | Facility: CLINIC | Age: 29
End: 2024-05-10
Payer: COMMERCIAL

## 2024-05-10 ENCOUNTER — ANCILLARY PROCEDURE (OUTPATIENT)
Dept: GENERAL RADIOLOGY | Facility: CLINIC | Age: 29
End: 2024-05-10
Attending: PHYSICIAN ASSISTANT
Payer: COMMERCIAL

## 2024-05-10 DIAGNOSIS — Z98.890 S/P ORIF (OPEN REDUCTION INTERNAL FIXATION) FRACTURE: Primary | ICD-10-CM

## 2024-05-10 DIAGNOSIS — Z98.890 S/P ORIF (OPEN REDUCTION INTERNAL FIXATION) FRACTURE: ICD-10-CM

## 2024-05-10 DIAGNOSIS — Z87.81 S/P ORIF (OPEN REDUCTION INTERNAL FIXATION) FRACTURE: ICD-10-CM

## 2024-05-10 DIAGNOSIS — Z87.81 S/P ORIF (OPEN REDUCTION INTERNAL FIXATION) FRACTURE: Primary | ICD-10-CM

## 2024-05-10 PROCEDURE — 99024 POSTOP FOLLOW-UP VISIT: CPT | Performed by: PHYSICIAN ASSISTANT

## 2024-05-10 PROCEDURE — 73610 X-RAY EXAM OF ANKLE: CPT | Mod: RT | Performed by: RADIOLOGY

## 2024-05-10 NOTE — NURSING NOTE
DME FITTING    Relevant Diagnosis: S/P ORIF R ankle  Lace up Ankle brace was fit on patient's Right ankle.     Person(s) involved in teaching:   Patient and     Brace was applied in standard Manner:  Yes  Brace fit well:  Yes  Patient reports brace to fit comfortably:  Yes    Education:   Patient shown self application and removal of brace: Yes  Patient shown how to adjust brace fit, if necessary: Yes  Patient educated on billing and return policy: Yes  Patient confirmed understanding when and how to contact clinic with concerns: Yes

## 2024-05-10 NOTE — LETTER
5/10/2024         RE: Mitzi See  [address] [facility] [address]        Dear Colleague,    Thank you for referring your patient, Mitzi See, to the [facility]. Please see a copy of my visit note below.    SURGICAL PROCEDURE: open reduction and internal fixation right distal fibular fracture and Deltoid ligament repair with Dr. Inge Rajput    DATE OF PROCEDURE: 3/25/24, nearly 7 weeks out form surgery now    HISTORY OF PRESENT ILLNESS  Mitzi See is a 28 year old female seen for postoperative follow-up of a right distal fibula fracture and deltoid ligament tear with Dr. Inge Rajput. Since surgery pain has been well controlled. Denies fevers, chills, night sweats, numbness or tingling. No wound problems. Compliant with weight bearing restrictions and elevation. There have been no issues since surgery or last clinic visit. Denies numbness or tingling. Presents today with spouse, nearly full weight bearing in tall boot with 1 crutch under right arm.    Pain severity: 1/10    Other PMH:  has a past medical history of Closed fracture of distal lateral malleolus of ankle.    Surgical Hx:  has a past surgical history that includes Open reduction internal fixation ankle (Right, 3/25/2024) and Repair ligament ankle (Right, 3/25/2024).    Medications:   Current Outpatient Medications:      hydrOXYzine sammy (VISTARIL) 25 MG capsule, Take 1 capsule every 6 hours by mouth as needed to assist with pain control., Disp: 40 capsule, Rfl: 0     ondansetron (ZOFRAN ODT) 4 MG ODT tab, Take 1 tablet (4 mg) by mouth every 8 hours as needed for nausea, Disp: 4 tablet, Rfl: 0     oxyCODONE (ROXICODONE) 5 MG tablet, Take 1-2 tablets (5-10 mg) by mouth every 4 hours as needed for moderate to severe pain, Disp: 26 tablet, Rfl: 0     senna-docusate (SENOKOT-S/PERICOLACE) 8.6-50 MG tablet, Take 1-2 tablets by mouth 2 times daily, Disp: 30 tablet, Rfl: 0    Allergies: No Known  Allergies    Social Hx:  reports that she has never smoked. She has never used smokeless tobacco. She reports that she does not drink alcohol and does not use drugs.    Family Hx: family history is not on file.    REVIEW OF SYSTEMS:  CONSTITUTIONAL:NEGATIVE for fever, chills, change in weight  INTEGUMENTARY/SKIN: NEGATIVE for worrisome rashes, moles or lesions  MUSCULOSKELETAL:See HPI above  NEURO: NEGATIVE for weakness, dizziness or paresthesias      PHYSICAL EXAM:  GENERAL APPEARANCE: healthy, alert, no distress   SKIN: no suspicious lesions or rashes  NEURO: Normal strength and tone, mentation intact and speech normal  PSYCH:  mentation appears normal and affect normal/bright  RESPIRATORY: No increased work of breathing.      right LOWER EXTREMITY EXAM:  Intact sensation deep peroneal nerve, superficial peroneal nerve, med/lat tibial nerve, sural nerve, saphenous nerve  Intact EHL, EDL, TA, FHL, GS, quadriceps hamstrings and hip flexors  Toes warm and well perfused, brisk capillary refill. Palpable 2+ dp pulses.  calf soft and nttp or squeeze.  No palpable popliteal lymphadenopathy.  Edema: 1+    X-RAY:  3 views right ankle from 5/10/2024 were reviewed in clinic today. IMPRESSION: Interval operative reduction and internal fixation of the left distal fibular fracture with a lateral plate and multiple screws. Hardware is well seated without loosening or other complication. Joint alignment is normal. interval healing noted across fracture.    Impression: 28 year old female nearly 7 weeks  postoperative right open reduction and internal fixation right distal fibular fracture and deltoid ligament repair with Regulo Jacobson, doing well.    Plan:  *Weight bearing status: can discontinue tall boot and convert to ankle brace and shoe with crutch, per comfort. Transition to full weight bearing in brace and shoe  *Physical Therapy - Continue. Aggressive ROM, progress weight bearing in brace  *Pain control: no more prescriptions  required. OTC tylenol   *Immobilization: Ankle brace - sweedo type, and well fitting tennis shoe. Transition to full weight bearing, as able per comfort  *Discussed carrying 1 crutch in contralateral, left, arm. Discussed rationale and demonstrated.  *Elevation of affected extremity  *Return to clinic in 4 weeks, or sooner as needed. Anticipate her walking in to clinic unassisted, reciprocal gait, in brace and tennis shoe. Plan repeat x-rays.    Michael Carranza PA-C, CAQ-OS  Dept. of Orthopedic Surgery  [facility]'            Again, thank you for allowing me to participate in the care of your patient.        Sincerely,        KIMBERLI Yun

## 2024-05-16 ENCOUNTER — THERAPY VISIT (OUTPATIENT)
Dept: PHYSICAL THERAPY | Facility: CLINIC | Age: 29
End: 2024-05-16
Payer: COMMERCIAL

## 2024-05-16 DIAGNOSIS — Z98.890 S/P ORIF (OPEN REDUCTION INTERNAL FIXATION) FRACTURE: Primary | ICD-10-CM

## 2024-05-16 DIAGNOSIS — Z87.81 S/P ORIF (OPEN REDUCTION INTERNAL FIXATION) FRACTURE: Primary | ICD-10-CM

## 2024-05-16 PROCEDURE — 97110 THERAPEUTIC EXERCISES: CPT | Mod: GP

## 2024-05-23 ENCOUNTER — THERAPY VISIT (OUTPATIENT)
Dept: PHYSICAL THERAPY | Facility: CLINIC | Age: 29
End: 2024-05-23
Payer: COMMERCIAL

## 2024-05-23 DIAGNOSIS — Z98.890 S/P ORIF (OPEN REDUCTION INTERNAL FIXATION) FRACTURE: Primary | ICD-10-CM

## 2024-05-23 DIAGNOSIS — Z87.81 S/P ORIF (OPEN REDUCTION INTERNAL FIXATION) FRACTURE: Primary | ICD-10-CM

## 2024-05-23 PROCEDURE — 97140 MANUAL THERAPY 1/> REGIONS: CPT | Mod: GP | Performed by: PHYSICAL THERAPIST

## 2024-05-23 PROCEDURE — 97110 THERAPEUTIC EXERCISES: CPT | Mod: GP | Performed by: PHYSICAL THERAPIST

## 2024-05-30 ENCOUNTER — THERAPY VISIT (OUTPATIENT)
Dept: PHYSICAL THERAPY | Facility: CLINIC | Age: 29
End: 2024-05-30
Payer: COMMERCIAL

## 2024-05-30 DIAGNOSIS — Z98.890 S/P ORIF (OPEN REDUCTION INTERNAL FIXATION) FRACTURE: Primary | ICD-10-CM

## 2024-05-30 DIAGNOSIS — Z87.81 S/P ORIF (OPEN REDUCTION INTERNAL FIXATION) FRACTURE: Primary | ICD-10-CM

## 2024-05-30 PROCEDURE — 97140 MANUAL THERAPY 1/> REGIONS: CPT | Mod: GP

## 2024-05-30 PROCEDURE — 97110 THERAPEUTIC EXERCISES: CPT | Mod: GP

## 2024-06-03 NOTE — PROGRESS NOTES
SURGICAL PROCEDURE: open reduction and internal fixation right distal fibular fracture and Deltoid ligament repair with Dr. Inge Rajput    DATE OF PROCEDURE: 3/25/24, nearly 11 weeks out form surgery now    HISTORY OF PRESENT ILLNESS  Mitzi See is a 28 year old female seen for postoperative follow-up of a right distal fibula fracture and deltoid ligament tear with Dr. Inge Rapjut. Since surgery pain has been well controlled. Denies fevers, chills, night sweats, numbness or tingling. No wound problems. Compliant with weight bearing restrictions and elevation. There have been no issues since surgery or last clinic visit. Denies numbness or tingling. Presents today with sister, full weight bearing in brace and slippers.    Pain severity: 1/10    Other PMH:  has a past medical history of Closed fracture of distal lateral malleolus of ankle.    Surgical Hx:  has a past surgical history that includes Open reduction internal fixation ankle (Right, 3/25/2024) and Repair ligament ankle (Right, 3/25/2024).    Medications:   Current Outpatient Medications:     hydrOXYzine sammy (VISTARIL) 25 MG capsule, Take 1 capsule every 6 hours by mouth as needed to assist with pain control., Disp: 40 capsule, Rfl: 0    ondansetron (ZOFRAN ODT) 4 MG ODT tab, Take 1 tablet (4 mg) by mouth every 8 hours as needed for nausea, Disp: 4 tablet, Rfl: 0    oxyCODONE (ROXICODONE) 5 MG tablet, Take 1-2 tablets (5-10 mg) by mouth every 4 hours as needed for moderate to severe pain, Disp: 26 tablet, Rfl: 0    senna-docusate (SENOKOT-S/PERICOLACE) 8.6-50 MG tablet, Take 1-2 tablets by mouth 2 times daily, Disp: 30 tablet, Rfl: 0    Allergies: No Known Allergies    Social Hx:  reports that she has never smoked. She has never used smokeless tobacco. She reports that she does not drink alcohol and does not use drugs.    Family Hx: family history is not on file.    REVIEW OF SYSTEMS:  CONSTITUTIONAL:NEGATIVE for fever, chills, change in  weight  INTEGUMENTARY/SKIN: NEGATIVE for worrisome rashes, moles or lesions  MUSCULOSKELETAL:See HPI above  NEURO: NEGATIVE for weakness, dizziness or paresthesias      PHYSICAL EXAM:  GENERAL APPEARANCE: healthy, alert, no distress   SKIN: no suspicious lesions or rashes  NEURO: Normal strength and tone, mentation intact and speech normal  PSYCH:  mentation appears normal and affect normal/bright  RESPIRATORY: No increased work of breathing.      right LOWER EXTREMITY EXAM:  Intact sensation deep peroneal nerve, superficial peroneal nerve, med/lat tibial nerve, sural nerve, saphenous nerve  Intact EHL, EDL, TA, FHL, GS, quadriceps hamstrings and hip flexors  Toes warm and well perfused, brisk capillary refill. Palpable 2+ dp pulses.  calf soft and nttp or squeeze.  No palpable popliteal lymphadenopathy.  Edema: 1+    X-RAY:  3 views right ankle from 5/10/2024 were reviewed in clinic today. IMPRESSION: Interval operative reduction and internal fixation of the left distal fibular fracture with a lateral plate and multiple screws. Hardware is well seated without loosening or other complication. Joint alignment is normal. interval healing noted across fracture.    Impression: 28 year old female nearly 11 weeks  postoperative right open reduction and internal fixation right distal fibular fracture and deltoid ligament repair with Dr. Rajput, doing well.    Plan:  *Weight bearing status:  ankle brace and slippers today. Transfer out of brace as tolerated, per comfort. Wear well fitting lace-up tennis-type shoe.  *Physical Therapy - Continue. Aggressive ROM, progress weight bearing in brace with shoe to just well fitting shoe - no more slippers  *Pain control: no more prescriptions required. OTC tylenol PRN  *Immobilization: Ankle brace - sweedo type, and well fitting tennis shoe. Transition to full weight bearing in well fitting shoe, as able per comfort  *Elevation of affected extremity  *Return to clinic in 4 weeks, or  sooner as needed. Anticipate her walking in to clinic unassisted, reciprocal gait in tennis shoe. Plan repeat x-rays at that time.    Michael Carranza PA-C, CAQ-OS  Dept. of Orthopedic Surgery  [facility]'

## 2024-06-07 ENCOUNTER — OFFICE VISIT (OUTPATIENT)
Dept: ORTHOPEDICS | Facility: CLINIC | Age: 29
End: 2024-06-07
Payer: COMMERCIAL

## 2024-06-07 DIAGNOSIS — Z98.890 S/P ORIF (OPEN REDUCTION INTERNAL FIXATION) FRACTURE: Primary | ICD-10-CM

## 2024-06-07 DIAGNOSIS — Z87.81 S/P ORIF (OPEN REDUCTION INTERNAL FIXATION) FRACTURE: Primary | ICD-10-CM

## 2024-06-07 PROCEDURE — 99024 POSTOP FOLLOW-UP VISIT: CPT | Performed by: PHYSICIAN ASSISTANT

## 2024-06-07 ASSESSMENT — PAIN SCALES - GENERAL: PAINLEVEL: MILD PAIN (2)

## 2024-06-07 NOTE — LETTER
6/7/2024      Mitzi See  [address] [facility] [address]      Dear Colleague,    Thank you for referring your patient, Mitzi See, to the [facility]. Please see a copy of my visit note below.    SURGICAL PROCEDURE: open reduction and internal fixation right distal fibular fracture and Deltoid ligament repair with Dr. Inge Rajput    DATE OF PROCEDURE: 3/25/24, nearly 11 weeks out form surgery now    HISTORY OF PRESENT ILLNESS  Mitzi See is a 28 year old female seen for postoperative follow-up of a right distal fibula fracture and deltoid ligament tear with Dr. Inge Rajput. Since surgery pain has been well controlled. Denies fevers, chills, night sweats, numbness or tingling. No wound problems. Compliant with weight bearing restrictions and elevation. There have been no issues since surgery or last clinic visit. Denies numbness or tingling. Presents today with sister, full weight bearing in brace and slippers.    Pain severity: 1/10    Other PMH:  has a past medical history of Closed fracture of distal lateral malleolus of ankle.    Surgical Hx:  has a past surgical history that includes Open reduction internal fixation ankle (Right, 3/25/2024) and Repair ligament ankle (Right, 3/25/2024).    Medications:   Current Outpatient Medications:      hydrOXYzine sammy (VISTARIL) 25 MG capsule, Take 1 capsule every 6 hours by mouth as needed to assist with pain control., Disp: 40 capsule, Rfl: 0     ondansetron (ZOFRAN ODT) 4 MG ODT tab, Take 1 tablet (4 mg) by mouth every 8 hours as needed for nausea, Disp: 4 tablet, Rfl: 0     oxyCODONE (ROXICODONE) 5 MG tablet, Take 1-2 tablets (5-10 mg) by mouth every 4 hours as needed for moderate to severe pain, Disp: 26 tablet, Rfl: 0     senna-docusate (SENOKOT-S/PERICOLACE) 8.6-50 MG tablet, Take 1-2 tablets by mouth 2 times daily, Disp: 30 tablet, Rfl: 0    Allergies: No Known Allergies    Social Hx:  reports that she has never  smoked. She has never used smokeless tobacco. She reports that she does not drink alcohol and does not use drugs.    Family Hx: family history is not on file.    REVIEW OF SYSTEMS:  CONSTITUTIONAL:NEGATIVE for fever, chills, change in weight  INTEGUMENTARY/SKIN: NEGATIVE for worrisome rashes, moles or lesions  MUSCULOSKELETAL:See HPI above  NEURO: NEGATIVE for weakness, dizziness or paresthesias      PHYSICAL EXAM:  GENERAL APPEARANCE: healthy, alert, no distress   SKIN: no suspicious lesions or rashes  NEURO: Normal strength and tone, mentation intact and speech normal  PSYCH:  mentation appears normal and affect normal/bright  RESPIRATORY: No increased work of breathing.      right LOWER EXTREMITY EXAM:  Intact sensation deep peroneal nerve, superficial peroneal nerve, med/lat tibial nerve, sural nerve, saphenous nerve  Intact EHL, EDL, TA, FHL, GS, quadriceps hamstrings and hip flexors  Toes warm and well perfused, brisk capillary refill. Palpable 2+ dp pulses.  calf soft and nttp or squeeze.  No palpable popliteal lymphadenopathy.  Edema: 1+    X-RAY:  3 views right ankle from 5/10/2024 were reviewed in clinic today. IMPRESSION: Interval operative reduction and internal fixation of the left distal fibular fracture with a lateral plate and multiple screws. Hardware is well seated without loosening or other complication. Joint alignment is normal. interval healing noted across fracture.    Impression: 28 year old female nearly 11 weeks  postoperative right open reduction and internal fixation right distal fibular fracture and deltoid ligament repair with Dr. Rajput, doing well.    Plan:  *Weight bearing status:  ankle brace and slippers today. Transfer out of brace as tolerated, per comfort. Wear well fitting lace-up tennis-type shoe.  *Physical Therapy - Continue. Aggressive ROM, progress weight bearing in brace with shoe to just well fitting shoe - no more slippers  *Pain control: no more prescriptions required.  OTC tylenol PRN  *Immobilization: Ankle brace - sweedo type, and well fitting tennis shoe. Transition to full weight bearing in well fitting shoe, as able per comfort  *Elevation of affected extremity  *Return to clinic in 4 weeks, or sooner as needed. Anticipate her walking in to clinic unassisted, reciprocal gait in tennis shoe. Plan repeat x-rays at that time.    Michael Carranza PA-C, CAQ-OS  Dept. of Orthopedic Surgery  [facility]'            Again, thank you for allowing me to participate in the care of your patient.        Sincerely,        KIMBERLI Yun

## 2024-07-10 NOTE — PROGRESS NOTES
SUBJECTIVE:  Mizti See is a 28 year old year old female who is here today for follow up following right ankle surgery.     SURGICAL PROCEDURE: open reduction and internal fixation right distal fibular fracture and Deltoid ligament repair.     DATE OF PROCEDURE: 3/25/24, 3.5 months ago.     Stopped using the boot a month after surgery she says.  Lace up brace when out of house. Helps.     Pain and swelling  Pain lat and medial  Pain if on feet more than 10 minutes.  No fevers or chills.  Ices, elevates-- helps   Sits mainly at work.    OBJECTIVE:   /72 (BP Location: Left arm, Patient Position: Sitting, Cuff Size: Adult Regular)   Pulse 70   SpO2 96%    Patient appears to be alert and in no apparent distress.  Skin intact.    Neurovascularly Intact.    ROM: mild limitations   Tenderness: none over the fracture site(s).  Pes planus with weight bearing   Has pes planus bilateral     X-rays today:  Show the fracture maintaining position.  Mortice looks good.  Fracture healed    ASSESSMENT:   Status post open reduction and internal fixation right ankle.  Posterior tibial tendonitis/  Bilateral pes planus    PLAN:   Weight Bearing: As tolerated in regular shoe  Orthotics ordered  Rehab: home exercise program    Work: no issues  Medications: nsaid suggested  Try to wean brace.    Return to clinic 6 weeks/or as needed  .      MANAN Rajput MD  Dept. Orthopedic Surgery  [facility]

## 2024-07-11 ENCOUNTER — OFFICE VISIT (OUTPATIENT)
Dept: ORTHOPEDICS | Facility: CLINIC | Age: 29
End: 2024-07-11
Payer: COMMERCIAL

## 2024-07-11 ENCOUNTER — ANCILLARY PROCEDURE (OUTPATIENT)
Dept: GENERAL RADIOLOGY | Facility: CLINIC | Age: 29
End: 2024-07-11
Attending: ORTHOPAEDIC SURGERY
Payer: COMMERCIAL

## 2024-07-11 VITALS — OXYGEN SATURATION: 96 % | HEART RATE: 70 BPM | SYSTOLIC BLOOD PRESSURE: 112 MMHG | DIASTOLIC BLOOD PRESSURE: 72 MMHG

## 2024-07-11 DIAGNOSIS — Z87.81 S/P ORIF (OPEN REDUCTION INTERNAL FIXATION) FRACTURE: ICD-10-CM

## 2024-07-11 DIAGNOSIS — Z98.890 S/P ORIF (OPEN REDUCTION INTERNAL FIXATION) FRACTURE: ICD-10-CM

## 2024-07-11 DIAGNOSIS — Z87.81 S/P ORIF (OPEN REDUCTION INTERNAL FIXATION) FRACTURE: Primary | ICD-10-CM

## 2024-07-11 DIAGNOSIS — Z98.890 S/P ORIF (OPEN REDUCTION INTERNAL FIXATION) FRACTURE: Primary | ICD-10-CM

## 2024-07-11 DIAGNOSIS — M76.821 POSTERIOR TIBIAL TENDINITIS OF RIGHT LOWER EXTREMITY: ICD-10-CM

## 2024-07-11 LAB — RADIOLOGIST FLAGS: ABNORMAL

## 2024-07-11 PROCEDURE — 73610 X-RAY EXAM OF ANKLE: CPT | Mod: TC | Performed by: RADIOLOGY

## 2024-07-11 PROCEDURE — 99213 OFFICE O/P EST LOW 20 MIN: CPT | Performed by: ORTHOPAEDIC SURGERY

## 2024-07-11 ASSESSMENT — PAIN SCALES - GENERAL: PAINLEVEL: MILD PAIN (3)

## 2024-07-11 NOTE — LETTER
7/11/2024      Mitzi See  [address] [facility] [address]      Dear Colleague,    Thank you for referring your patient, Mitzi See, to the [facility]. Please see a copy of my visit note below.    SUBJECTIVE:  Mitzi See is a 28 year old year old female who is here today for follow up following right ankle surgery.     SURGICAL PROCEDURE: open reduction and internal fixation right distal fibular fracture and Deltoid ligament repair.     DATE OF PROCEDURE: 3/25/24, 3.5 months ago.     Stopped using the boot a month after surgery she says.  Lace up brace when out of house. Helps.     Pain and swelling  Pain lat and medial  Pain if on feet more than 10 minutes.  No fevers or chills.  Ices, elevates-- helps   Sits mainly at work.    OBJECTIVE:   /72 (BP Location: Left arm, Patient Position: Sitting, Cuff Size: Adult Regular)   Pulse 70   SpO2 96%    Patient appears to be alert and in no apparent distress.  Skin intact.    Neurovascularly Intact.    ROM: mild limitations   Tenderness: none over the fracture site(s).  Pes planus with weight bearing   Has pes planus bilateral     X-rays today:  Show the fracture maintaining position.  Mortice looks good.  Fracture healed    ASSESSMENT:   Status post open reduction and internal fixation right ankle.  Posterior tibial tendonitis/  Bilateral pes planus    PLAN:   Weight Bearing: As tolerated in regular shoe  Orthotics ordered  Rehab: home exercise program    Work: no issues  Medications: nsaid suggested  Try to wean brace.    Return to clinic 6 weeks/or as needed  .      MANAN Rajput MD  Dept. Orthopedic Surgery  [facility]      Again, thank you for allowing me to participate in the care of your patient.        Sincerely,        Rudolph Rajput MD

## 2024-07-26 ENCOUNTER — THERAPY VISIT (OUTPATIENT)
Dept: PHYSICAL THERAPY | Facility: CLINIC | Age: 29
End: 2024-07-26
Payer: COMMERCIAL

## 2024-07-26 DIAGNOSIS — Z98.890 S/P ORIF (OPEN REDUCTION INTERNAL FIXATION) FRACTURE: Primary | ICD-10-CM

## 2024-07-26 DIAGNOSIS — Z87.81 S/P ORIF (OPEN REDUCTION INTERNAL FIXATION) FRACTURE: Primary | ICD-10-CM

## 2024-07-26 PROCEDURE — 97110 THERAPEUTIC EXERCISES: CPT | Mod: GP | Performed by: PHYSICAL THERAPIST

## 2024-07-31 ENCOUNTER — THERAPY VISIT (OUTPATIENT)
Dept: PHYSICAL THERAPY | Facility: CLINIC | Age: 29
End: 2024-07-31
Attending: ORTHOPAEDIC SURGERY
Payer: COMMERCIAL

## 2024-07-31 DIAGNOSIS — M76.821 POSTERIOR TIBIAL TENDINITIS OF RIGHT LOWER EXTREMITY: ICD-10-CM

## 2024-07-31 DIAGNOSIS — Z87.81 S/P ORIF (OPEN REDUCTION INTERNAL FIXATION) FRACTURE: ICD-10-CM

## 2024-07-31 DIAGNOSIS — Z98.890 S/P ORIF (OPEN REDUCTION INTERNAL FIXATION) FRACTURE: ICD-10-CM

## 2024-07-31 PROCEDURE — 97140 MANUAL THERAPY 1/> REGIONS: CPT | Mod: GP | Performed by: PHYSICAL THERAPIST

## 2024-07-31 PROCEDURE — 97110 THERAPEUTIC EXERCISES: CPT | Mod: GP | Performed by: PHYSICAL THERAPIST

## 2024-07-31 NOTE — PROGRESS NOTES
07/26/24 0500   Appointment Info   Signing clinician's name / credentials Satish Shankar, PT, DPT   Total/Authorized Visits 12   Visits Used 6   Medical Diagnosis s/p R ankle ORIF with deltoid ligament repair   PT Tx Diagnosis R ankle pain, R ankle stiffness, R ankle weakness   Precautions/Limitations NWB until 4/26/2024   Other pertinent information [id number]   Quick Adds Certification   Progress Note/Certification   Start of Care Date 04/18/24   Onset of illness/injury or Date of Surgery 03/04/24   Therapy Frequency 1x/week   Predicted Duration 12 weeks   Certification date from 07/11/24   Certification date to 09/05/24   Progress Note Due Date 05/16/24   Progress Note Completed Date 04/18/24   PT Goal 1   Goal Description Patient will demonstrate >= 5 degrees of R ankle DF   Rationale to maximize safety and independence with performance of ADLs and functional tasks;to maximize safety and independence within the community;to maximize safety and independence within the home   Goal Progress Pt to neutral today   Target Date 07/11/24   PT Goal 2   Goal Description Patient will demonstrate ability to ambulate without compensations   Rationale to maximize safety and independence within the community;to maximize safety and independence within the home   Target Date 07/11/24   Subjective Report   Subjective Report 8 minutes late to treatment. Pt reports activity tolerance is improving through her ankle and mobility and balance does seem to be improving. Still havine come pain along the inner aspect of the ankle and foot when on her feet for prolonged periods of time. utiilzing a brace for ambualtion.   Objective Measure 1   Objective Measure Ankle ROM   Details right ankle PF 50, DF 5, inv 34, sandra 23   Objective Measure 2   Objective Measure Edema   Details improved but still present around bilateral malleoli on R   Therapeutic Procedure/Exercise   Therapeutic Procedures: strength, endurance, ROM, flexibility minutes  (96188) 28   Skilled Intervention tactile/verbal cues to facilitate with proper technique and muscle activity   Patient Response/Progress tolerated well, painf ree with progressions   PTRx Ther Proc 1 Toe Spread   PTRx Ther Proc 1 - Details VR continue   PTRx Ther Proc 2 Towel Gather   PTRx Ther Proc 2 - Details VR continue   PTRx Ther Proc 3 Standing Soleus Stretch   PTRx Ther Proc 3 - Details 2 x 30s   PTRx Ther Proc 4 Standing Gastroc Stretch   PTRx Ther Proc 4 - Details 2 x 30s   PTRx Ther Proc 5 Clamshell with Theraband   PTRx Ther Proc 5 - Details RTB x 10   PTRx Ther Proc 6 Theraband Ankle Inversion   PTRx Ther Proc 6 - Details YTB x 15   PTRx Ther Proc 7 Theraband Ankle Plantarflexion   PTRx Ther Proc 7 - Details GTB x 15   Neuromuscular Re-education   PTRx Neuro Re-ed 1 Single Leg Stance - Balance Right Foot   PTRx Neuro Re-ed 1 - Details x 3 min with intermittent UE assist   Plan   Home program PTRx on phone   Plan for next session continue to progress ROM, Single leg balance, wean off brace, therex progressions and manual as indicated.   Total Session Time   Timed Code Treatment Minutes 28   Total Treatment Time (sum of timed and untimed services) 28         [facility]                                                                                   OUTPATIENT PHYSICAL THERAPY    PLAN OF TREATMENT FOR OUTPATIENT REHABILITATION   Patient's Last Name, First Name, JOSE SeeMitzi  Georgette YOB: 1995   Provider's Name   [facility]   Medical Record No.  [id number]     Onset Date: 03/04/24  Start of Care Date: 04/18/24     Medical Diagnosis:  s/p R ankle ORIF with deltoid ligament repair      PT Treatment Diagnosis:  R ankle pain, R ankle stiffness, R ankle weakness Plan of Treatment  Frequency/Duration: 1x/week/ 12 weeks    Certification date from 07/11/24 to 09/05/24         See note for plan of treatment details and functional  goals     Satish Shankar, PT                         I CERTIFY THE NEED FOR THESE SERVICES FURNISHED UNDER        THIS PLAN OF TREATMENT AND WHILE UNDER MY CARE     (Physician attestation of this document indicates review and certification of the therapy plan).              Referring Provider:  Rudolph Rajput    Initial Assessment  See Epic Evaluation- Start of Care Date: 04/18/24

## 2024-08-06 ENCOUNTER — THERAPY VISIT (OUTPATIENT)
Dept: PHYSICAL THERAPY | Facility: CLINIC | Age: 29
End: 2024-08-06
Attending: ORTHOPAEDIC SURGERY
Payer: COMMERCIAL

## 2024-08-06 DIAGNOSIS — Z87.81 S/P ORIF (OPEN REDUCTION INTERNAL FIXATION) FRACTURE: Primary | ICD-10-CM

## 2024-08-06 DIAGNOSIS — Z98.890 S/P ORIF (OPEN REDUCTION INTERNAL FIXATION) FRACTURE: Primary | ICD-10-CM

## 2024-08-06 PROCEDURE — 97110 THERAPEUTIC EXERCISES: CPT | Mod: GP | Performed by: PHYSICAL THERAPIST

## 2024-08-06 PROCEDURE — 97140 MANUAL THERAPY 1/> REGIONS: CPT | Mod: GP | Performed by: PHYSICAL THERAPIST

## 2024-08-26 ENCOUNTER — THERAPY VISIT (OUTPATIENT)
Dept: PHYSICAL THERAPY | Facility: CLINIC | Age: 29
End: 2024-08-26
Payer: COMMERCIAL

## 2024-08-26 DIAGNOSIS — Z87.81 S/P ORIF (OPEN REDUCTION INTERNAL FIXATION) FRACTURE: Primary | ICD-10-CM

## 2024-08-26 DIAGNOSIS — Z98.890 S/P ORIF (OPEN REDUCTION INTERNAL FIXATION) FRACTURE: Primary | ICD-10-CM

## 2024-08-26 PROCEDURE — 97140 MANUAL THERAPY 1/> REGIONS: CPT | Mod: GP | Performed by: PHYSICAL THERAPIST

## 2024-08-26 PROCEDURE — 97110 THERAPEUTIC EXERCISES: CPT | Mod: GP | Performed by: PHYSICAL THERAPIST

## 2024-09-19 ENCOUNTER — THERAPY VISIT (OUTPATIENT)
Dept: PHYSICAL THERAPY | Facility: CLINIC | Age: 29
End: 2024-09-19
Payer: COMMERCIAL

## 2024-09-19 DIAGNOSIS — Z98.890 S/P ORIF (OPEN REDUCTION INTERNAL FIXATION) FRACTURE: Primary | ICD-10-CM

## 2024-09-19 DIAGNOSIS — Z87.81 S/P ORIF (OPEN REDUCTION INTERNAL FIXATION) FRACTURE: Primary | ICD-10-CM

## 2024-09-19 PROCEDURE — 97110 THERAPEUTIC EXERCISES: CPT | Mod: GP | Performed by: PHYSICAL THERAPIST

## 2024-09-19 PROCEDURE — 97112 NEUROMUSCULAR REEDUCATION: CPT | Mod: GP | Performed by: PHYSICAL THERAPIST

## 2024-09-19 NOTE — PROGRESS NOTES
09/19/24 0500   Appointment Info   Signing clinician's name / credentials Anna Dillard, PT, DPT   Total/Authorized Visits 12   Visits Used 10   Medical Diagnosis s/p R ankle ORIF with deltoid ligament repair   PT Tx Diagnosis R ankle pain, R ankle stiffness, R ankle weakness   Other pertinent information pt 13 minutes late today- RECHECK NEXT- LEFS15/80   Progress Note/Certification   Start of Care Date 04/18/24   Onset of illness/injury or Date of Surgery 03/04/24   Therapy Frequency 1x per month   Predicted Duration 3 months   Certification date from 07/11/24   Certification date to 09/06/24   Progress Note Due Date 12/19/24   Progress Note Completed Date 09/19/24   PT Goal 1   Goal Description Patient will demonstrate >= 5 degrees of R ankle DF   Rationale to maximize safety and independence with performance of ADLs and functional tasks;to maximize safety and independence within the community;to maximize safety and independence within the home   Target Date 07/11/24   Date Met 07/31/24   PT Goal 2   Goal Description Patient will demonstrate ability to ambulate without compensations   Rationale to maximize safety and independence within the community;to maximize safety and independence within the home   Goal Progress improved gait today, still issues with stairs, gait improved however still sometimes wide KAYCE with short distances.   Target Date 09/19/25   Subjective Report   Subjective Report pt reports her ankle is still tight and painful with stairs, notes more pain over achilles area pointed to today. has also trialed wearing orthotics but not sure this is helping with pain.   Objective Measure 1   Objective Measure Ankle ROM   Details 50* plantarflexion R, 8* dorsiflexion R; 12* dorsiflexion L, 60* plantarflexion L.   Objective Measure 2   Objective Measure functional strength   Details able to do 4 inch step down with R foot on the step however significant cues on form when performing.   Therapeutic  Procedure/Exercise   Therapeutic Procedures: strength, endurance, ROM, flexibility minutes (30581) 15   PTRx Ther Proc 1 Anterior Ankle Stretch   PTRx Ther Proc 1 - Details per HEP hold for 1 minute atleast 2-3x per day.    PTRx Ther Proc 2 Calf Raise - Single Leg Off Raised Surface   PTRx Ther Proc 2 - Details per HEP do it off of the step but use 2 feet when performing off of the step so that you get more of a toe stretch when performing. goal of 10-20 repetitions total 2x per day. today x10 x1 set today with fatigue.    PTRx Ther Proc 3 Stepdown Forward   PTRx Ther Proc 3 - Details per HEP 4 inch hinge from the toes. 10-20 repetitions 2x daily.   PTRx Ther Proc 4 Lunge Forward Single Arm Up with One Kettlebell   PTRx Ther Proc 4 - Details per HEP no weight in arms unless you want to. you can hold on to stretch the toes with a counter top. 10 repetitions 2x daily.   Skilled Intervention improve strength and mobility   Patient Response/Progress pt reports much less pain after PT today.   Neuromuscular Re-education   Neuromuscular re-ed of mvmt, balance, coord, kinesthetic sense, posture, proprioception minutes (14327) 10   Neuro Re-ed 1 tandem stance x30s holds BLE with ease, stepping over hurdles with ease x4 hurdles, error 2x of accidentally knocking over with step to pattern at first then with further practice step through pattern with 4 hurdles 1 feet apart x6 inches in height.   Patient Response/Progress pt reports ease with performing.   Skilled Intervention ease with balance, d/c next session and focus on mobility   Plan   Home program PTRx on phone   Updates to plan of care focused more on stretching today, continue stretching with mobility.   Plan for next session NEXT- squat deeply, look at lunges again, focus on ROM gains with mobility into extreme positions such as deep squat?   Total Session Time   Timed Code Treatment Minutes 25   Total Treatment Time (sum of timed and untimed services) 25         [facility]  [facility]                                                                                   OUTPATIENT PHYSICAL THERAPY    PLAN OF TREATMENT FOR OUTPATIENT REHABILITATION   Patient's Last Name, First Name, Mitzi Ambriz YOB: 1995   Provider's Name   [facility]   Medical Record No.  [id number]     Onset Date: 03/04/24  Start of Care Date: 04/18/24     Medical Diagnosis:  s/p R ankle ORIF with deltoid ligament repair      PT Treatment Diagnosis:  R ankle pain, R ankle stiffness, R ankle weakness Plan of Treatment  Frequency/Duration: 1x per month/ 3 months    Certification date from 07/11/24 to 09/06/24         See note for plan of treatment details and functional goals     Anna Dillard, PT                         I CERTIFY THE NEED FOR THESE SERVICES FURNISHED UNDER        THIS PLAN OF TREATMENT AND WHILE UNDER MY CARE     (Physician attestation of this document indicates review and certification of the therapy plan).              Referring Provider:  Rudolph Rajput    Initial Assessment  See Epic Evaluation- Start of Care Date: 04/18/24            PLAN  Continue therapy per current plan of care.    Beginning/End Dates of Progress Note Reporting Period:  09/19/24 to 09/19/2024    Referring Provider:  Rudolph Rajput

## 2024-10-04 ENCOUNTER — THERAPY VISIT (OUTPATIENT)
Dept: PHYSICAL THERAPY | Facility: CLINIC | Age: 29
End: 2024-10-04
Payer: COMMERCIAL

## 2024-10-04 DIAGNOSIS — Z98.890 S/P ORIF (OPEN REDUCTION INTERNAL FIXATION) FRACTURE: Primary | ICD-10-CM

## 2024-10-04 DIAGNOSIS — Z87.81 S/P ORIF (OPEN REDUCTION INTERNAL FIXATION) FRACTURE: Primary | ICD-10-CM

## 2024-10-04 PROCEDURE — 97110 THERAPEUTIC EXERCISES: CPT | Mod: GP | Performed by: PHYSICAL THERAPIST

## 2024-10-04 PROCEDURE — 97530 THERAPEUTIC ACTIVITIES: CPT | Mod: GP | Performed by: PHYSICAL THERAPIST

## 2024-10-04 NOTE — PROGRESS NOTES
10/04/24 0500   Appointment Info   Signing clinician's name / credentials Anna Dillard, PT, DPT   Total/Authorized Visits 15   Visits Used 11   Medical Diagnosis s/p R ankle ORIF with deltoid ligament repair   PT Tx Diagnosis R ankle pain, R ankle stiffness, R ankle weakness   Progress Note/Certification   Start of Care Date 04/18/24   Onset of illness/injury or Date of Surgery 03/04/24   Therapy Frequency 1x per month   Predicted Duration 3 months   Certification date from 09/07/24   Certification date to 01/04/25   Progress Note Due Date 01/04/25   Progress Note Completed Date 10/04/24   GOALS   PT Goals 2;3;4   PT Goal 1   Goal Description Patient will demonstrate >= 5 degrees of R ankle DF   Rationale to maximize safety and independence with performance of ADLs and functional tasks;to maximize safety and independence within the community;to maximize safety and independence within the home   Target Date 07/11/24   Date Met 07/31/24   PT Goal 2   Goal Description Patient will demonstrate ability to ambulate without compensations   Rationale to maximize safety and independence within the community;to maximize safety and independence within the home   Target Date 09/19/25   Date Met 10/04/24   PT Goal 3   Goal Identifier Stairs   Goal Description pt will be able to walk up and down stairs at full height without increased pain or reduced range of motion.k   Rationale to maximize safety and independence with performance of ADLs and functional tasks   Target Date 01/04/25   Subjective Report   Subjective Report pt reports notes ankle is much better with new exercises.   Objective Measure 1   Objective Measure Ankle ROM   Details 8* dorsiflexion R, 55* plantarflexion R   Objective Measure 2   Objective Measure functional strength   Details pt able to amb with no gait deviations, high difficulty still with 4 inch step down.   Treatment Interventions (PT)   Interventions Therapeutic Procedure/Exercise;Therapeutic  Activity   Therapeutic Procedure/Exercise   Therapeutic Procedures: strength, endurance, ROM, flexibility minutes (86573) 15   Ther Proc 1 backwards walking 1.5 mph at 2-4%incline, forwards walking x3 minutes at 4% incline gait 1.5 mph. pt education on focus on equal step length for this to improve ankle mobility   PTRx Ther Proc 1 Anterior Ankle Stretch   PTRx Ther Proc 1 - Details per HEP hold for 1 minute atleast 2-3x per day. today x1 minute x1 set   PTRx Ther Proc 2 Calf Raise - Single Leg Off Raised Surface   PTRx Ther Proc 2 - Details per HEP do it off of the step but use 2 feet when performing off of the step so that you get more of a toe stretch when performing. goal of 10-20 repetitions total 2x per day. today did not review   Skilled Intervention improve strength and mobility   Patient Response/Progress pt reports much less pain after PT today.   Therapeutic Activity   PTRx Ther Act 1 Body Mechanics - Full Squat   PTRx Ther Act 1 - Details per HEP try to hold the position for 1 minute while holding onto a table or counter top shift weight backwards more to reduce the strain. you could do this every other day alternate with the lunges. 2 sets at a time. today x1 minute x1 set   PTRx Ther Act 2 Stepdown Forward   PTRx Ther Act 2 - Details per HEP 4 inch hinge from the toes. 10-20 repetitions 2x daily. Today x20 x1 set manual cues on toe hinge.   PTRx Ther Act 3 Lunge Forward Single Arm Up with One Kettlebell   PTRx Ther Act 3 - Details per HEPno weight in arms unless you want to. you can hold on to stretch the toes with a counter top. 10 repetitions every other day with the squat. 10 repetitions 2x daily.   Therapeutic Activities: dynamic activities to improve functional performance minutes (39406) 20   Skilled Intervention improve mobility   Patient Response/Progress pt reports fatigue with HEP   Plan   Home program PTRx on phone   Updates to plan of care focused more on stretching today, continue  stretching with mobility as significant improves with mobility   Plan for next session recheck backwards walking   Total Session Time   Timed Code Treatment Minutes 35   Total Treatment Time (sum of timed and untimed services) 35         [facility]                                                                                   OUTPATIENT PHYSICAL THERAPY    PLAN OF TREATMENT FOR OUTPATIENT REHABILITATION   Patient's Last Name, First Name, Mitzi Ambriz YOB: 1995   Provider's Name   [facility]   Medical Record No.  [id number]     Onset Date: 03/04/24  Start of Care Date: 04/18/24     Medical Diagnosis:  s/p R ankle ORIF with deltoid ligament repair      PT Treatment Diagnosis:  R ankle pain, R ankle stiffness, R ankle weakness Plan of Treatment  Frequency/Duration: 1x per month/ 3 months    Certification date from 09/07/24 to 01/04/25         See note for plan of treatment details and functional goals     Anna Dillard, PT                         I CERTIFY THE NEED FOR THESE SERVICES FURNISHED UNDER        THIS PLAN OF TREATMENT AND WHILE UNDER MY CARE     (Physician attestation of this document indicates review and certification of the therapy plan).              Referring Provider:  Rudolph Rajput    Initial Assessment  See Epic Evaluation- Start of Care Date: 04/18/24            PLAN  Continue therapy per current plan of care.    Beginning/End Dates of Progress Note Reporting Period:  10/04/24 to 10/04/2024    Referring Provider:  Rudolph Rajput

## 2024-10-22 NOTE — PROGRESS NOTES
"SUBJECTIVE:  Mitzi See is a 29 year old year old female who is here today for follow up following right ankle surgery.      SURGICAL PROCEDURE: open reduction and internal fixation right distal fibular fracture and Deltoid ligament repair.     DATE OF PROCEDURE: 3/25/24, 7 months ago.     Pain posterior, lateral and lateral swelling if stands a lot, or walks a lot.  Feels like right foot \"lags behind\" the left when walking. Limps.     Treatment: gets off feet which gets swelling down.  Orthotics didn't help, were more painful than without. Wore for a couple of weeks.  Had physical therapy. Goes 1x /month-- has helped.  Working on walking. Strengthening.     Review of Systems:  Constitutional/General: Negative for fever, chills, change in weight  Integumentary/Skin: Negative for worrisome rashes, moles, or lesions  Neuro: Negative for weakness, dizziness, or paresthesias   Psychiatric: negative for changes in mood or affect    OBJECTIVE:  Physical Exam:  /85 (BP Location: Right arm, Patient Position: Sitting, Cuff Size: Adult Large)   Pulse 82   SpO2 99%   General Appearance: healthy, alert and no distress   Skin: no suspicious lesions or rashes  Neuro: Normal strength and tone, mentation intact and speech normal  Vascular: good pulses, and capillary refill   Lymph: no lymphadenopathy   Psych:  mentation appears normal and affect normal/bright  Resp: no increased work of breathing     Patient appears to be alert and in no apparent distress.  Skin intact.    Neurovascularly Intact.    Localized swelling of the lateral ankle  ROM: full  Tenderness: over lateral ligaments  Non-tender over plate, medial side, non-tender over peroneal tendons  Mild pain with resisted eversion + plantar flexion.  No motor or sensory deficits.     X-rays today:  Show the fracture maintaining position.  Mortice looks good.  Fracture healed    ASSESSMENT:     ICD-10-CM    1. Fracture of ankle, closed, right, with routine " healing, subsequent encounter  S80.074M XR Ankle Right G/E 3 Views        PLAN:   Weight Bearing: full  Rehab: continue to work on exercises on own and in physical therapy. Work on normalizing gait.   Try Voltaren gel lateral ankle. Modalities in physical therapy   Consider corticosteroid injection locally around lateral ligaments if persists  Hardware removal may help. No less than 1 year postoperative.  Work: full duty    Return to clinic as needed     MANAN Rajput MD  Dept. Orthopedic Surgery  [facility]

## 2024-10-24 ENCOUNTER — ANCILLARY PROCEDURE (OUTPATIENT)
Dept: GENERAL RADIOLOGY | Facility: CLINIC | Age: 29
End: 2024-10-24
Attending: ORTHOPAEDIC SURGERY
Payer: COMMERCIAL

## 2024-10-24 ENCOUNTER — OFFICE VISIT (OUTPATIENT)
Dept: ORTHOPEDICS | Facility: CLINIC | Age: 29
End: 2024-10-24
Payer: COMMERCIAL

## 2024-10-24 VITALS — OXYGEN SATURATION: 99 % | SYSTOLIC BLOOD PRESSURE: 135 MMHG | DIASTOLIC BLOOD PRESSURE: 85 MMHG | HEART RATE: 82 BPM

## 2024-10-24 DIAGNOSIS — S82.891D FRACTURE OF ANKLE, CLOSED, RIGHT, WITH ROUTINE HEALING, SUBSEQUENT ENCOUNTER: Primary | ICD-10-CM

## 2024-10-24 DIAGNOSIS — S82.891D FRACTURE OF ANKLE, CLOSED, RIGHT, WITH ROUTINE HEALING, SUBSEQUENT ENCOUNTER: ICD-10-CM

## 2024-10-24 PROCEDURE — 99213 OFFICE O/P EST LOW 20 MIN: CPT | Performed by: ORTHOPAEDIC SURGERY

## 2024-10-24 PROCEDURE — 73610 X-RAY EXAM OF ANKLE: CPT | Mod: TC | Performed by: RADIOLOGY

## 2024-10-24 ASSESSMENT — PAIN SCALES - GENERAL: PAINLEVEL_OUTOF10: NO PAIN (0)

## 2024-10-24 NOTE — LETTER
"10/24/2024      Mitzi See  [id number] [address] [facility] [address] [address]      Dear Colleague,    Thank you for referring your patient, Mitzi See, to the [facility]. Please see a copy of my visit note below.    SUBJECTIVE:  Mitzi See is a 29 year old year old female who is here today for follow up following right ankle surgery.      SURGICAL PROCEDURE: open reduction and internal fixation right distal fibular fracture and Deltoid ligament repair.     DATE OF PROCEDURE: 3/25/24, 7 months ago.     Pain posterior, lateral and lateral swelling if stands a lot, or walks a lot.  Feels like right foot \"lags behind\" the left when walking. Limps.     Treatment: gets off feet which gets swelling down.  Orthotics didn't help, were more painful than without. Wore for a couple of weeks.  Had physical therapy. Goes 1x /month-- has helped.  Working on walking. Strengthening.     Review of Systems:  Constitutional/General: Negative for fever, chills, change in weight  Integumentary/Skin: Negative for worrisome rashes, moles, or lesions  Neuro: Negative for weakness, dizziness, or paresthesias   Psychiatric: negative for changes in mood or affect    OBJECTIVE:  Physical Exam:  /85 (BP Location: Right arm, Patient Position: Sitting, Cuff Size: Adult Large)   Pulse 82   SpO2 99%   General Appearance: healthy, alert and no distress   Skin: no suspicious lesions or rashes  Neuro: Normal strength and tone, mentation intact and speech normal  Vascular: good pulses, and capillary refill   Lymph: no lymphadenopathy   Psych:  mentation appears normal and affect normal/bright  Resp: no increased work of breathing     Patient appears to be alert and in no apparent distress.  Skin intact.    Neurovascularly Intact.    Localized swelling of the lateral ankle  ROM: full  Tenderness: over lateral ligaments  Non-tender over plate, medial side, non-tender over peroneal tendons  Mild pain with resisted " eversion + plantar flexion.  No motor or sensory deficits.     X-rays today:  Show the fracture maintaining position.  Mortice looks good.  Fracture healed    ASSESSMENT:     ICD-10-CM    1. Fracture of ankle, closed, right, with routine healing, subsequent encounter  S82.891D XR Ankle Right G/E 3 Views        PLAN:   Weight Bearing: full  Rehab: continue to work on exercises on own and in physical therapy. Work on normalizing gait.   Try Voltaren gel lateral ankle. Modalities in physical therapy   Consider corticosteroid injection locally around lateral ligaments if persists  Hardware removal may help. No less than 1 year postoperative.  Work: full duty    Return to clinic as needed     MANAN Rajput MD  Dept. Orthopedic Surgery  [facility]      Again, thank you for allowing me to participate in the care of your patient.        Sincerely,        Rudolph Rajput MD

## 2024-12-03 ENCOUNTER — THERAPY VISIT (OUTPATIENT)
Dept: PHYSICAL THERAPY | Facility: CLINIC | Age: 29
End: 2024-12-03
Payer: COMMERCIAL

## 2024-12-03 DIAGNOSIS — Z87.81 S/P ORIF (OPEN REDUCTION INTERNAL FIXATION) FRACTURE: Primary | ICD-10-CM

## 2024-12-03 DIAGNOSIS — Z98.890 S/P ORIF (OPEN REDUCTION INTERNAL FIXATION) FRACTURE: Primary | ICD-10-CM

## 2024-12-03 PROCEDURE — 97110 THERAPEUTIC EXERCISES: CPT | Mod: GP | Performed by: PHYSICAL THERAPIST

## 2024-12-03 PROCEDURE — 97530 THERAPEUTIC ACTIVITIES: CPT | Mod: GP | Performed by: PHYSICAL THERAPIST

## 2025-03-16 ENCOUNTER — HEALTH MAINTENANCE LETTER (OUTPATIENT)
Age: 30
End: 2025-03-16

## (undated) DEVICE — DRSG ABDOMINAL 07 1/2X8" 7197D

## (undated) DEVICE — BNDG ELASTIC 4" DBL LENGTH UNSTERILE 6611-14

## (undated) DEVICE — SPONGE RAY-TEC 4X8" 7318

## (undated) DEVICE — DRAPE STERI U 1015

## (undated) DEVICE — DRAPE C-ARM 60X42" 1013

## (undated) DEVICE — GLOVE BIOGEL PI MICRO INDICATOR UNDERGLOVE SZ 8.0 48980

## (undated) DEVICE — NDL 19GA 1.5"

## (undated) DEVICE — PREP CHLORAPREP 26ML TINTED ORANGE  260815

## (undated) DEVICE — GLOVE BIOGEL PI ULTRATOUCH G SZ 8.5 42185

## (undated) DEVICE — DRSG GAUZE 4X4" TRAY 6939

## (undated) DEVICE — SU FIBERWIRE 0 38" BLUE 22.2MM W/TAPER NDL  AR-7250

## (undated) DEVICE — GLOVE BIOGEL PI MICRO INDICATOR UNDERGLOVE SZ 7.5 48975

## (undated) DEVICE — PACK EXTREMITY SOP15EXFSD

## (undated) DEVICE — SOL PRP PVP IOD .75OZ PCH PKT CNTNR STRL DYNDA2232A

## (undated) DEVICE — SUTURE VICRYL+ 2-0 CT-2 27" UND VCP269H

## (undated) DEVICE — CAST PLASTER SPLINT 5X30" 7395

## (undated) DEVICE — GLOVE BIOGEL PI ULTRATOUCH G SZ 7.5 42175

## (undated) DEVICE — LO-PRO SCRW,TI,3.5MMX 20MM
Type: IMPLANTABLE DEVICE | Site: ANKLE | Status: NON-FUNCTIONAL
Brand: ARTHREX®
Removed: 2024-03-25

## (undated) DEVICE — CAST PADDING 4" UNSTERILE 9044

## (undated) DEVICE — SU ETHILON 3-0 PS-2 18" 1669H

## (undated) DEVICE — CAST PADDING 4" STERILE 9044S

## (undated) DEVICE — SOL WATER IRRIG 1000ML BOTTLE 07139-09

## (undated) DEVICE — ESU GROUND PAD ADULT W/CORD E7507

## (undated) DEVICE — DRILL BIT ARTHREX 2.5MM AR-8943-30

## (undated) RX ORDER — CEFAZOLIN SODIUM/WATER 2 G/20 ML
SYRINGE (ML) INTRAVENOUS
Status: DISPENSED
Start: 2024-03-14

## (undated) RX ORDER — ONDANSETRON 2 MG/ML
INJECTION INTRAMUSCULAR; INTRAVENOUS
Status: DISPENSED
Start: 2024-03-25

## (undated) RX ORDER — CEFAZOLIN SODIUM 2 G/50ML
SOLUTION INTRAVENOUS
Status: DISPENSED
Start: 2024-03-25

## (undated) RX ORDER — ACETAMINOPHEN 325 MG/1
TABLET ORAL
Status: DISPENSED
Start: 2024-03-25

## (undated) RX ORDER — CEFAZOLIN SODIUM/WATER 2 G/20 ML
SYRINGE (ML) INTRAVENOUS
Status: DISPENSED
Start: 2024-03-15

## (undated) RX ORDER — CEFAZOLIN SODIUM/WATER 2 G/20 ML
SYRINGE (ML) INTRAVENOUS
Status: DISPENSED
Start: 2024-03-13

## (undated) RX ORDER — BUPIVACAINE HYDROCHLORIDE 5 MG/ML
INJECTION, SOLUTION EPIDURAL; INTRACAUDAL
Status: DISPENSED
Start: 2024-03-25

## (undated) RX ORDER — PROPOFOL 10 MG/ML
INJECTION, EMULSION INTRAVENOUS
Status: DISPENSED
Start: 2024-03-25

## (undated) RX ORDER — TRANEXAMIC ACID 10 MG/ML
INJECTION, SOLUTION INTRAVENOUS
Status: DISPENSED
Start: 2024-03-17

## (undated) RX ORDER — FENTANYL CITRATE 50 UG/ML
INJECTION, SOLUTION INTRAMUSCULAR; INTRAVENOUS
Status: DISPENSED
Start: 2024-03-25